# Patient Record
Sex: MALE | Race: WHITE
[De-identification: names, ages, dates, MRNs, and addresses within clinical notes are randomized per-mention and may not be internally consistent; named-entity substitution may affect disease eponyms.]

---

## 2017-10-16 ENCOUNTER — HOSPITAL (OUTPATIENT)
Dept: OTHER | Age: 67
End: 2017-10-16
Attending: ORTHOPAEDIC SURGERY

## 2018-10-02 ENCOUNTER — HOSPITAL (OUTPATIENT)
Dept: OTHER | Age: 68
End: 2018-10-02
Attending: ORTHOPAEDIC SURGERY

## 2018-10-02 LAB
GLUCOSE BLDC GLUCOMTR-MCNC: 103 MG/DL (ref 65–99)
GLUCOSE BLDC GLUCOMTR-MCNC: 124 MG/DL (ref 65–99)

## 2018-12-04 ENCOUNTER — HOSPITAL (OUTPATIENT)
Dept: OTHER | Age: 68
End: 2018-12-04
Attending: ORTHOPAEDIC SURGERY

## 2018-12-18 LAB
GLUCOSE BLDC GLUCOMTR-MCNC: 178 MG/DL (ref 65–99)
GLUCOSE BLDC GLUCOMTR-MCNC: 178 MG/DL (ref 65–99)
GLUCOSE BLDC GLUCOMTR-MCNC: 188 MG/DL (ref 65–99)
GLUCOSE BLDC GLUCOMTR-MCNC: 188 MG/DL (ref 65–99)
GLUCOSE BLDC GLUCOMTR-MCNC: 213 MG/DL (ref 65–99)
GLUCOSE BLDC GLUCOMTR-MCNC: 213 MG/DL (ref 65–99)
GLUCOSE BLDC GLUCOMTR-MCNC: 234 MG/DL (ref 65–99)
GLUCOSE BLDC GLUCOMTR-MCNC: 234 MG/DL (ref 65–99)
GLUCOSE BLDC GLUCOMTR-MCNC: 237 MG/DL (ref 65–99)
GLUCOSE BLDC GLUCOMTR-MCNC: 244 MG/DL (ref 65–99)
GLUCOSE BLDC GLUCOMTR-MCNC: 290 MG/DL (ref 65–99)
GLUCOSE BLDC GLUCOMTR-MCNC: 298 MG/DL (ref 65–99)

## 2018-12-19 ENCOUNTER — DIAGNOSTIC TRANS (OUTPATIENT)
Dept: OTHER | Age: 68
End: 2018-12-19

## 2018-12-19 ENCOUNTER — HOSPITAL (OUTPATIENT)
Dept: OTHER | Age: 68
End: 2018-12-19
Attending: ORTHOPAEDIC SURGERY

## 2018-12-19 LAB
ANALYZER ANC (IANC): ABNORMAL
ANION GAP SERPL CALC-SCNC: 18 MMOL/L (ref 10–20)
BUN SERPL-MCNC: 33 MG/DL (ref 6–20)
BUN/CREAT SERPL: 20 (ref 7–25)
CALCIUM SERPL-MCNC: 8.5 MG/DL (ref 8.4–10.2)
CHLORIDE: 103 MMOL/L (ref 98–107)
CO2 SERPL-SCNC: 18 MMOL/L (ref 21–32)
CREAT SERPL-MCNC: 1.66 MG/DL (ref 0.67–1.17)
ERYTHROCYTE [DISTWIDTH] IN BLOOD: 15.4 % (ref 11–15)
GLUCOSE BLDC GLUCOMTR-MCNC: 142 MG/DL (ref 65–99)
GLUCOSE BLDC GLUCOMTR-MCNC: 174 MG/DL (ref 65–99)
GLUCOSE BLDC GLUCOMTR-MCNC: 203 MG/DL (ref 65–99)
GLUCOSE BLDC GLUCOMTR-MCNC: 221 MG/DL (ref 65–99)
GLUCOSE BLDC GLUCOMTR-MCNC: 226 MG/DL (ref 65–99)
GLUCOSE BLDC GLUCOMTR-MCNC: 259 MG/DL (ref 65–99)
GLUCOSE BLDC GLUCOMTR-MCNC: 95 MG/DL (ref 65–99)
GLUCOSE SERPL-MCNC: 245 MG/DL (ref 65–99)
HEMATOCRIT: 43 % (ref 39–51)
HGB BLD-MCNC: 13.7 GM/DL (ref 13–17)
MAGNESIUM SERPL-MCNC: 2 MG/DL (ref 1.7–2.4)
MCH RBC QN AUTO: 26.9 PG (ref 26–34)
MCHC RBC AUTO-ENTMCNC: 31.9 GM/DL (ref 32–36.5)
MCV RBC AUTO: 84.3 FL (ref 78–100)
NRBC (NRBCRE): 0 /100 WBC
PHOSPHATE SERPL-MCNC: 4.6 MG/DL (ref 2.4–4.7)
PLATELET # BLD: 280 THOUSAND/MCL (ref 140–450)
POTASSIUM SERPL-SCNC: 4.1 MMOL/L (ref 3.4–5.1)
RBC # BLD: 5.1 MILLION/MCL (ref 4.5–5.9)
SODIUM SERPL-SCNC: 135 MMOL/L (ref 135–145)
TROPONIN I SERPL HS-MCNC: <0.02 NG/ML
WBC # BLD: 11 THOUSAND/MCL (ref 4.2–11)

## 2018-12-20 LAB
ANALYZER ANC (IANC): ABNORMAL
ANION GAP SERPL CALC-SCNC: 13 MMOL/L (ref 10–20)
BUN SERPL-MCNC: 38 MG/DL (ref 6–20)
BUN/CREAT SERPL: 22 (ref 7–25)
CALCIUM SERPL-MCNC: 8.2 MG/DL (ref 8.4–10.2)
CHLORIDE: 106 MMOL/L (ref 98–107)
CO2 SERPL-SCNC: 22 MMOL/L (ref 21–32)
CREAT SERPL-MCNC: 1.73 MG/DL (ref 0.67–1.17)
ERYTHROCYTE [DISTWIDTH] IN BLOOD: 15.5 % (ref 11–15)
GLUCOSE BLDC GLUCOMTR-MCNC: 104 MG/DL (ref 65–99)
GLUCOSE BLDC GLUCOMTR-MCNC: 125 MG/DL (ref 65–99)
GLUCOSE BLDC GLUCOMTR-MCNC: 142 MG/DL (ref 65–99)
GLUCOSE BLDC GLUCOMTR-MCNC: 144 MG/DL (ref 65–99)
GLUCOSE BLDC GLUCOMTR-MCNC: 169 MG/DL (ref 65–99)
GLUCOSE BLDC GLUCOMTR-MCNC: 57 MG/DL (ref 65–99)
GLUCOSE SERPL-MCNC: 102 MG/DL (ref 65–99)
HEMATOCRIT: 34.8 % (ref 39–51)
HGB BLD-MCNC: 11.6 GM/DL (ref 13–17)
MAGNESIUM SERPL-MCNC: 2.2 MG/DL (ref 1.7–2.4)
MCH RBC QN AUTO: 28.2 PG (ref 26–34)
MCHC RBC AUTO-ENTMCNC: 33.3 GM/DL (ref 32–36.5)
MCV RBC AUTO: 84.7 FL (ref 78–100)
NRBC (NRBCRE): 0 /100 WBC
PHOSPHATE SERPL-MCNC: 4.2 MG/DL (ref 2.4–4.7)
PLATELET # BLD: 182 THOUSAND/MCL (ref 140–450)
POTASSIUM SERPL-SCNC: 3.8 MMOL/L (ref 3.4–5.1)
RBC # BLD: 4.11 MILLION/MCL (ref 4.5–5.9)
SODIUM SERPL-SCNC: 137 MMOL/L (ref 135–145)
WBC # BLD: 6.5 THOUSAND/MCL (ref 4.2–11)

## 2018-12-21 LAB
ANION GAP SERPL CALC-SCNC: 9 MMOL/L (ref 10–20)
BUN SERPL-MCNC: 26 MG/DL (ref 6–20)
BUN/CREAT SERPL: 21 (ref 7–25)
CALCIUM SERPL-MCNC: 8.4 MG/DL (ref 8.4–10.2)
CHLORIDE: 109 MMOL/L (ref 98–107)
CO2 SERPL-SCNC: 24 MMOL/L (ref 21–32)
CREAT SERPL-MCNC: 1.22 MG/DL (ref 0.67–1.17)
GLUCOSE BLDC GLUCOMTR-MCNC: 113 MG/DL (ref 65–99)
GLUCOSE BLDC GLUCOMTR-MCNC: 120 MG/DL (ref 65–99)
GLUCOSE BLDC GLUCOMTR-MCNC: 122 MG/DL (ref 65–99)
GLUCOSE BLDC GLUCOMTR-MCNC: 122 MG/DL (ref 65–99)
GLUCOSE BLDC GLUCOMTR-MCNC: 132 MG/DL (ref 65–99)
GLUCOSE BLDC GLUCOMTR-MCNC: 140 MG/DL (ref 65–99)
GLUCOSE BLDC GLUCOMTR-MCNC: 64 MG/DL (ref 65–99)
GLUCOSE BLDC GLUCOMTR-MCNC: 67 MG/DL (ref 65–99)
GLUCOSE BLDC GLUCOMTR-MCNC: 77 MG/DL (ref 65–99)
GLUCOSE BLDC GLUCOMTR-MCNC: 91 MG/DL (ref 65–99)
GLUCOSE SERPL-MCNC: 122 MG/DL (ref 65–99)
PATHOLOGIST NAME: NORMAL
POTASSIUM SERPL-SCNC: 4.3 MMOL/L (ref 3.4–5.1)
SODIUM SERPL-SCNC: 138 MMOL/L (ref 135–145)

## 2018-12-22 LAB
GLUCOSE BLDC GLUCOMTR-MCNC: 110 MG/DL (ref 65–99)
GLUCOSE BLDC GLUCOMTR-MCNC: 149 MG/DL (ref 65–99)
GLUCOSE BLDC GLUCOMTR-MCNC: 181 MG/DL (ref 65–99)

## 2019-04-02 ENCOUNTER — HOSPITAL (OUTPATIENT)
Dept: OTHER | Age: 69
End: 2019-04-02
Attending: ORTHOPAEDIC SURGERY

## 2019-04-02 ENCOUNTER — HOSPITAL (OUTPATIENT)
Dept: OTHER | Age: 69
End: 2019-04-02

## 2019-04-02 LAB
GLUCOSE BLDC GLUCOMTR-MCNC: 132 MG/DL (ref 65–99)
GLUCOSE BLDC GLUCOMTR-MCNC: 132 MG/DL (ref 65–99)
GLUCOSE BLDC GLUCOMTR-MCNC: 150 MG/DL (ref 65–99)
GLUCOSE BLDC GLUCOMTR-MCNC: 150 MG/DL (ref 65–99)
GLUCOSE BLDC GLUCOMTR-MCNC: 152 MG/DL (ref 65–99)
GLUCOSE BLDC GLUCOMTR-MCNC: 152 MG/DL (ref 65–99)
GLUCOSE BLDC GLUCOMTR-MCNC: 162 MG/DL (ref 65–99)
GLUCOSE BLDC GLUCOMTR-MCNC: 162 MG/DL (ref 65–99)

## 2019-04-03 ENCOUNTER — HOSPITAL (OUTPATIENT)
Dept: OTHER | Age: 69
End: 2019-04-03

## 2019-04-03 LAB
ANALYZER ANC (IANC): ABNORMAL
ERYTHROCYTE [DISTWIDTH] IN BLOOD: 15.3 % (ref 11–15)
GLUCOSE BLDC GLUCOMTR-MCNC: 151 MG/DL (ref 65–99)
GLUCOSE BLDC GLUCOMTR-MCNC: 166 MG/DL (ref 65–99)
HEMATOCRIT: 39.2 % (ref 39–51)
HGB BLD-MCNC: 12.5 GM/DL (ref 13–17)
MCH RBC QN AUTO: 27.2 PG (ref 26–34)
MCHC RBC AUTO-ENTMCNC: 31.9 GM/DL (ref 32–36.5)
MCV RBC AUTO: 85.2 FL (ref 78–100)
NRBC (NRBCRE): 0 /100 WBC
PLATELET # BLD: 216 THOUSAND/MCL (ref 140–450)
RBC # BLD: 4.6 MILLION/MCL (ref 4.5–5.9)
WBC # BLD: 5.2 THOUSAND/MCL (ref 4.2–11)

## 2019-04-18 ENCOUNTER — HOSPITAL (OUTPATIENT)
Dept: OTHER | Age: 69
End: 2019-04-18
Attending: ORTHOPAEDIC SURGERY

## 2019-04-18 LAB
APPEARANCE FLD: ABNORMAL
APPEARANCE FLD: ABNORMAL
BASOPHILS NFR SNV: ABNORMAL %
BASOPHILS NFR SNV: ABNORMAL %
CRYSTALS FLD MICRO: NORMAL
EOSINOPHIL NFR SNV: ABNORMAL %
EOSINOPHIL NFR SNV: ABNORMAL %
LYMPHOCYTES NFR SNV: ABNORMAL %
LYMPHOCYTES NFR SNV: ABNORMAL %
MONOS+MACROS NFR SNV: 4 % (ref 0–71)
MONOS+MACROS NFR SNV: 4 % (ref 0–71)
NEUTS SEG NFR SNV: 96 % (ref 0–25)
NEUTS SEG NFR SNV: 96 % (ref 0–25)
NUC CELL # SNV MANUAL: ABNORMAL /MCL (ref 0–200)
NUC CELL # SNV MANUAL: ABNORMAL /MCL (ref 0–200)
SERVICE CMNT-IMP: ABNORMAL
SERVICE CMNT-IMP: ABNORMAL
SPECIMEN SOURCE: ABNORMAL
SPECIMEN SOURCE: ABNORMAL
SPECIMEN SOURCE: NORMAL
SPECIMEN VOL FLD: 22 ML
SPECIMEN VOL FLD: 22 ML

## 2019-04-19 LAB
CRYSTALS FLD MICRO: NORMAL
SPECIMEN SOURCE: NORMAL

## 2019-04-23 ENCOUNTER — HOSPITAL (OUTPATIENT)
Dept: OTHER | Age: 69
End: 2019-04-23

## 2019-04-23 ENCOUNTER — HOSPITAL (OUTPATIENT)
Dept: OTHER | Age: 69
End: 2019-04-23
Attending: ORTHOPAEDIC SURGERY

## 2019-04-23 LAB
ANER/AEROBE CUL/SMR (AANC) HL: NORMAL
CLINDAMYCIN SUSC ISLT: ABNORMAL
DAPTOMYCIN SUSC ISLT: ABNORMAL
ERYTHROMYCIN SUSC ISLT: ABNORMAL
GENTAMICIN SUSC ISLT: ABNORMAL
GLUCOSE BLDC GLUCOMTR-MCNC: 255 MG/DL (ref 65–99)
GLUCOSE BLDC GLUCOMTR-MCNC: 255 MG/DL (ref 65–99)
GLUCOSE BLDC GLUCOMTR-MCNC: 256 MG/DL (ref 65–99)
GLUCOSE BLDC GLUCOMTR-MCNC: 256 MG/DL (ref 65–99)
GLUCOSE BLDC GLUCOMTR-MCNC: 300 MG/DL (ref 65–99)
GLUCOSE BLDC GLUCOMTR-MCNC: 300 MG/DL (ref 65–99)
LEVOFLOXACIN SUSC ISLT: ABNORMAL
LINEZOLID SUSC ISLT: ABNORMAL
MINOCYCLINE SUSC ISLT: ABNORMAL
OXACILLIN SUSC ISLT: ABNORMAL
TETRACYCLINE SUSC ISLT: ABNORMAL
TMP SMX SUSC ISLT: ABNORMAL
VANCOMYCIN SUSC ISLT: ABNORMAL

## 2019-04-24 ENCOUNTER — HOSPITAL (OUTPATIENT)
Dept: OTHER | Age: 69
End: 2019-04-24

## 2019-04-24 LAB
ANALYZER ANC (IANC): ABNORMAL
ANION GAP SERPL CALC-SCNC: 11 MMOL/L (ref 10–20)
BUN SERPL-MCNC: 18 MG/DL (ref 6–20)
BUN/CREAT SERPL: 16 (ref 7–25)
CALCIUM SERPL-MCNC: 9 MG/DL (ref 8.4–10.2)
CHLORIDE: 99 MMOL/L (ref 98–107)
CO2 SERPL-SCNC: 28 MMOL/L (ref 21–32)
CREAT SERPL-MCNC: 1.11 MG/DL (ref 0.67–1.17)
ERYTHROCYTE [DISTWIDTH] IN BLOOD: 16.3 % (ref 11–15)
GLUCOSE BLDC GLUCOMTR-MCNC: 237 MG/DL (ref 65–99)
GLUCOSE BLDC GLUCOMTR-MCNC: 250 MG/DL (ref 65–99)
GLUCOSE BLDC GLUCOMTR-MCNC: 259 MG/DL (ref 65–99)
GLUCOSE BLDC GLUCOMTR-MCNC: 288 MG/DL (ref 65–99)
GLUCOSE BLDC GLUCOMTR-MCNC: 335 MG/DL (ref 65–99)
GLUCOSE SERPL-MCNC: 232 MG/DL (ref 65–99)
HEMATOCRIT: 37.4 % (ref 39–51)
HGB BLD-MCNC: 11.8 GM/DL (ref 13–17)
MCH RBC QN AUTO: 27.3 PG (ref 26–34)
MCHC RBC AUTO-ENTMCNC: 31.6 GM/DL (ref 32–36.5)
MCV RBC AUTO: 86.6 FL (ref 78–100)
NRBC (NRBCRE): 0 /100 WBC
PLATELET # BLD: 361 THOUSAND/MCL (ref 140–450)
POTASSIUM SERPL-SCNC: 4.8 MMOL/L (ref 3.4–5.1)
RBC # BLD: 4.32 MILLION/MCL (ref 4.5–5.9)
SODIUM SERPL-SCNC: 133 MMOL/L (ref 135–145)
WBC # BLD: 7 THOUSAND/MCL (ref 4.2–11)

## 2019-04-25 LAB
ANALYZER ANC (IANC): ABNORMAL
ANION GAP SERPL CALC-SCNC: 14 MMOL/L (ref 10–20)
BASOPHILS # BLD: 0 THOUSAND/MCL (ref 0–0.3)
BASOPHILS NFR BLD: 0 %
BUN SERPL-MCNC: 16 MG/DL (ref 6–20)
BUN/CREAT SERPL: 13 (ref 7–25)
CALCIUM SERPL-MCNC: 8.5 MG/DL (ref 8.4–10.2)
CHLORIDE: 99 MMOL/L (ref 98–107)
CK SERPL-CCNC: 124 UNIT/L (ref 39–308)
CO2 SERPL-SCNC: 26 MMOL/L (ref 21–32)
CREAT SERPL-MCNC: 1.21 MG/DL (ref 0.67–1.17)
CRP SERPL-MCNC: 14 MG/DL
DIFFERENTIAL METHOD BLD: ABNORMAL
EOSINOPHIL # BLD: 0.3 THOUSAND/MCL (ref 0.1–0.5)
EOSINOPHIL NFR BLD: 4 %
ERYTHROCYTE [DISTWIDTH] IN BLOOD: 16.1 % (ref 11–15)
ERYTHROCYTE [SEDIMENTATION RATE] IN BLOOD BY WESTERGREN METHOD: 119 MM/HR (ref 0–20)
GLUCOSE BLDC GLUCOMTR-MCNC: 263 MG/DL (ref 65–99)
GLUCOSE BLDC GLUCOMTR-MCNC: 286 MG/DL (ref 65–99)
GLUCOSE BLDC GLUCOMTR-MCNC: 290 MG/DL (ref 65–99)
GLUCOSE BLDC GLUCOMTR-MCNC: 316 MG/DL (ref 65–99)
GLUCOSE BLDC GLUCOMTR-MCNC: 345 MG/DL (ref 65–99)
GLUCOSE SERPL-MCNC: 263 MG/DL (ref 65–99)
HEMATOCRIT: 34.1 % (ref 39–51)
HGB BLD-MCNC: 10.9 GM/DL (ref 13–17)
IMM GRANULOCYTES # BLD AUTO: 0.1 THOUSAND/MCL (ref 0–0.2)
IMM GRANULOCYTES NFR BLD: 1 %
LYMPHOCYTES # BLD: 1.2 THOUSAND/MCL (ref 1–4)
LYMPHOCYTES NFR BLD: 15 %
MCH RBC QN AUTO: 27.5 PG (ref 26–34)
MCHC RBC AUTO-ENTMCNC: 32 GM/DL (ref 32–36.5)
MCV RBC AUTO: 85.9 FL (ref 78–100)
MONOCYTES # BLD: 0.5 THOUSAND/MCL (ref 0.3–0.9)
MONOCYTES NFR BLD: 6 %
NEUTROPHILS # BLD: 5.8 THOUSAND/MCL (ref 1.8–7.7)
NEUTROPHILS NFR BLD: 74 %
NEUTS SEG NFR BLD: ABNORMAL %
NRBC (NRBCRE): 0 /100 WBC
PLATELET # BLD: 361 THOUSAND/MCL (ref 140–450)
POTASSIUM SERPL-SCNC: 5.5 MMOL/L (ref 3.4–5.1)
RBC # BLD: 3.97 MILLION/MCL (ref 4.5–5.9)
SODIUM SERPL-SCNC: 134 MMOL/L (ref 135–145)
WBC # BLD: 7.9 THOUSAND/MCL (ref 4.2–11)

## 2019-04-26 LAB
ANION GAP SERPL CALC-SCNC: 10 MMOL/L (ref 10–20)
BUN SERPL-MCNC: 15 MG/DL (ref 6–20)
BUN/CREAT SERPL: 14 (ref 7–25)
CALCIUM SERPL-MCNC: 8.7 MG/DL (ref 8.4–10.2)
CHLORIDE: 98 MMOL/L (ref 98–107)
CO2 SERPL-SCNC: 30 MMOL/L (ref 21–32)
CREAT SERPL-MCNC: 1.11 MG/DL (ref 0.67–1.17)
GLUCOSE BLDC GLUCOMTR-MCNC: 249 MG/DL (ref 65–99)
GLUCOSE BLDC GLUCOMTR-MCNC: 252 MG/DL (ref 65–99)
GLUCOSE BLDC GLUCOMTR-MCNC: 277 MG/DL (ref 65–99)
GLUCOSE BLDC GLUCOMTR-MCNC: 293 MG/DL (ref 65–99)
GLUCOSE BLDC GLUCOMTR-MCNC: 299 MG/DL (ref 65–99)
GLUCOSE SERPL-MCNC: 279 MG/DL (ref 65–99)
POTASSIUM SERPL-SCNC: 4.9 MMOL/L (ref 3.4–5.1)
SODIUM SERPL-SCNC: 133 MMOL/L (ref 135–145)

## 2019-04-27 LAB
ANION GAP SERPL CALC-SCNC: 10 MMOL/L (ref 10–20)
BUN SERPL-MCNC: 15 MG/DL (ref 6–20)
BUN/CREAT SERPL: 14 (ref 7–25)
CALCIUM SERPL-MCNC: 9.1 MG/DL (ref 8.4–10.2)
CHLORIDE: 100 MMOL/L (ref 98–107)
CO2 SERPL-SCNC: 28 MMOL/L (ref 21–32)
CREAT SERPL-MCNC: 1.05 MG/DL (ref 0.67–1.17)
GLUCOSE BLDC GLUCOMTR-MCNC: 192 MG/DL (ref 65–99)
GLUCOSE BLDC GLUCOMTR-MCNC: 198 MG/DL (ref 65–99)
GLUCOSE BLDC GLUCOMTR-MCNC: 212 MG/DL (ref 65–99)
GLUCOSE BLDC GLUCOMTR-MCNC: 255 MG/DL (ref 65–99)
GLUCOSE BLDC GLUCOMTR-MCNC: 300 MG/DL (ref 65–99)
GLUCOSE SERPL-MCNC: 235 MG/DL (ref 65–99)
POTASSIUM SERPL-SCNC: 5.4 MMOL/L (ref 3.4–5.1)
SODIUM SERPL-SCNC: 133 MMOL/L (ref 135–145)

## 2019-04-28 LAB
ANION GAP SERPL CALC-SCNC: 11 MMOL/L (ref 10–20)
BUN SERPL-MCNC: 17 MG/DL (ref 6–20)
BUN/CREAT SERPL: 17 (ref 7–25)
CALCIUM SERPL-MCNC: 9 MG/DL (ref 8.4–10.2)
CHLORIDE: 101 MMOL/L (ref 98–107)
CO2 SERPL-SCNC: 24 MMOL/L (ref 21–32)
CREAT SERPL-MCNC: 1 MG/DL (ref 0.67–1.17)
GLUCOSE BLDC GLUCOMTR-MCNC: 188 MG/DL (ref 65–99)
GLUCOSE BLDC GLUCOMTR-MCNC: 230 MG/DL (ref 65–99)
GLUCOSE BLDC GLUCOMTR-MCNC: 251 MG/DL (ref 65–99)
GLUCOSE BLDC GLUCOMTR-MCNC: 277 MG/DL (ref 65–99)
GLUCOSE SERPL-MCNC: 255 MG/DL (ref 65–99)
POTASSIUM SERPL-SCNC: 4.3 MMOL/L (ref 3.4–5.1)
SODIUM SERPL-SCNC: 132 MMOL/L (ref 135–145)

## 2019-05-21 LAB
FUNGAL CULTURE/SMEAR (FNCS) HL: NORMAL

## 2019-07-23 ENCOUNTER — DIAGNOSTIC TRANS (OUTPATIENT)
Dept: OTHER | Age: 69
End: 2019-07-23

## 2019-07-23 ENCOUNTER — HOSPITAL (OUTPATIENT)
Dept: OTHER | Age: 69
End: 2019-07-23
Attending: ORTHOPAEDIC SURGERY

## 2019-07-23 ENCOUNTER — HOSPITAL (OUTPATIENT)
Dept: OTHER | Age: 69
End: 2019-07-23

## 2019-07-23 LAB
GLUCOSE BLDC GLUCOMTR-MCNC: 177 MG/DL (ref 65–99)
GLUCOSE BLDC GLUCOMTR-MCNC: 189 MG/DL (ref 65–99)

## 2019-08-02 ENCOUNTER — HOSPITAL (OUTPATIENT)
Dept: OTHER | Age: 69
End: 2019-08-02
Attending: ORTHOPAEDIC SURGERY

## 2019-09-01 ENCOUNTER — HOSPITAL (OUTPATIENT)
Dept: OTHER | Age: 69
End: 2019-09-01
Attending: ORTHOPAEDIC SURGERY

## 2019-09-05 ENCOUNTER — HOSPITAL (OUTPATIENT)
Dept: OTHER | Age: 69
End: 2019-09-05
Attending: ORTHOPAEDIC SURGERY

## 2019-09-05 ENCOUNTER — HOSPITAL (OUTPATIENT)
Dept: OTHER | Age: 69
End: 2019-09-05

## 2019-09-05 LAB
ANION GAP SERPL CALC-SCNC: 15 MMOL/L (ref 10–20)
BUN SERPL-MCNC: 19 MG/DL (ref 6–20)
BUN/CREAT SERPL: 20 (ref 7–25)
CALCIUM SERPL-MCNC: 8.8 MG/DL (ref 8.4–10.2)
CHLORIDE SERPL-SCNC: 105 MMOL/L (ref 98–107)
CO2 SERPL-SCNC: 23 MMOL/L (ref 21–32)
CREAT SERPL-MCNC: 0.94 MG/DL (ref 0.67–1.17)
GLUCOSE SERPL-MCNC: 119 MG/DL (ref 65–99)
POTASSIUM SERPL-SCNC: 4.7 MMOL/L (ref 3.4–5.1)
SODIUM SERPL-SCNC: 138 MMOL/L (ref 135–145)

## 2019-09-06 LAB
GLUCOSE BLDC GLUCOMTR-MCNC: 125 MG/DL (ref 65–99)
GLUCOSE BLDC GLUCOMTR-MCNC: 147 MG/DL (ref 65–99)

## 2019-09-11 ENCOUNTER — HOSPITAL (OUTPATIENT)
Dept: OTHER | Age: 69
End: 2019-09-11
Attending: ORTHOPAEDIC SURGERY

## 2019-09-16 ENCOUNTER — HOSPITAL (OUTPATIENT)
Dept: OTHER | Age: 69
End: 2019-09-16
Attending: ORTHOPAEDIC SURGERY

## 2019-10-01 ENCOUNTER — HOSPITAL (OUTPATIENT)
Dept: OTHER | Age: 69
End: 2019-10-01
Attending: ORTHOPAEDIC SURGERY

## 2020-11-25 ENCOUNTER — HOSPITAL ENCOUNTER (OUTPATIENT)
Facility: HOSPITAL | Age: 70
End: 2020-11-25
Attending: ORTHOPAEDIC SURGERY | Admitting: ORTHOPAEDIC SURGERY
Payer: MEDICARE

## 2020-11-25 RX ORDER — VANCOMYCIN HYDROCHLORIDE
1500 ONCE
Status: CANCELLED | OUTPATIENT
Start: 2020-12-02 | End: 2020-11-25

## 2020-11-25 RX ORDER — CEFAZOLIN SODIUM/WATER 2 G/20 ML
2 SYRINGE (ML) INTRAVENOUS ONCE
Status: CANCELLED | OUTPATIENT
Start: 2020-12-02 | End: 2020-11-25

## 2020-11-30 ENCOUNTER — HOSPITAL ENCOUNTER (INPATIENT)
Facility: HOSPITAL | Age: 70
LOS: 7 days | Discharge: HOME HEALTH | DRG: 464 | End: 2020-12-07
Attending: ORTHOPAEDIC SURGERY | Admitting: ORTHOPAEDIC SURGERY
Payer: MEDICARE

## 2020-11-30 ENCOUNTER — APPOINTMENT (OUTPATIENT)
Dept: GENERAL RADIOLOGY | Facility: HOSPITAL | Age: 70
DRG: 464 | End: 2020-11-30
Attending: INTERNAL MEDICINE
Payer: MEDICARE

## 2020-11-30 PROBLEM — Z96.651 HISTORY OF REVISION OF TOTAL REPLACEMENT OF RIGHT KNEE JOINT: Status: ACTIVE | Noted: 2020-11-30

## 2020-11-30 PROCEDURE — 99223 1ST HOSP IP/OBS HIGH 75: CPT | Performed by: HOSPITALIST

## 2020-11-30 PROCEDURE — 73630 X-RAY EXAM OF FOOT: CPT | Performed by: INTERNAL MEDICINE

## 2020-11-30 RX ORDER — OXYCODONE HYDROCHLORIDE 5 MG/1
15 TABLET ORAL EVERY 4 HOURS PRN
Status: DISCONTINUED | OUTPATIENT
Start: 2020-11-30 | End: 2020-12-07

## 2020-11-30 RX ORDER — ACETAMINOPHEN 325 MG/1
650 TABLET ORAL EVERY 6 HOURS PRN
Status: DISCONTINUED | OUTPATIENT
Start: 2020-11-30 | End: 2020-12-03

## 2020-11-30 RX ORDER — HYDROCODONE BITARTRATE AND ACETAMINOPHEN 10; 325 MG/1; MG/1
1 TABLET ORAL EVERY 6 HOURS PRN
COMMUNITY

## 2020-11-30 RX ORDER — LISINOPRIL 10 MG/1
10 TABLET ORAL DAILY
Status: DISCONTINUED | OUTPATIENT
Start: 2020-11-30 | End: 2020-12-03

## 2020-11-30 RX ORDER — BISACODYL 10 MG
10 SUPPOSITORY, RECTAL RECTAL
Status: DISCONTINUED | OUTPATIENT
Start: 2020-11-30 | End: 2020-12-02

## 2020-11-30 RX ORDER — METOCLOPRAMIDE HYDROCHLORIDE 5 MG/ML
5 INJECTION INTRAMUSCULAR; INTRAVENOUS EVERY 8 HOURS PRN
Status: DISCONTINUED | OUTPATIENT
Start: 2020-11-30 | End: 2020-12-03

## 2020-11-30 RX ORDER — AMLODIPINE BESYLATE 5 MG/1
5 TABLET ORAL DAILY
COMMUNITY

## 2020-11-30 RX ORDER — TORSEMIDE 10 MG/1
20 TABLET ORAL DAILY
COMMUNITY

## 2020-11-30 RX ORDER — DEXTROSE MONOHYDRATE 25 G/50ML
50 INJECTION, SOLUTION INTRAVENOUS
Status: DISCONTINUED | OUTPATIENT
Start: 2020-11-30 | End: 2020-12-07

## 2020-11-30 RX ORDER — POLYETHYLENE GLYCOL 3350 17 G/17G
17 POWDER, FOR SOLUTION ORAL DAILY PRN
Status: DISCONTINUED | OUTPATIENT
Start: 2020-11-30 | End: 2020-12-02

## 2020-11-30 RX ORDER — OXYCODONE HYDROCHLORIDE 15 MG/1
15 TABLET ORAL EVERY 4 HOURS PRN
COMMUNITY

## 2020-11-30 RX ORDER — OMEPRAZOLE 20 MG/1
20 CAPSULE, DELAYED RELEASE ORAL
COMMUNITY

## 2020-11-30 RX ORDER — SODIUM CHLORIDE 0.9 % (FLUSH) 0.9 %
3 SYRINGE (ML) INJECTION AS NEEDED
Status: DISCONTINUED | OUTPATIENT
Start: 2020-11-30 | End: 2020-12-07

## 2020-11-30 RX ORDER — MORPHINE SULFATE 30 MG/1
60 TABLET, FILM COATED, EXTENDED RELEASE ORAL EVERY 12 HOURS SCHEDULED
Status: DISCONTINUED | OUTPATIENT
Start: 2020-11-30 | End: 2020-12-07

## 2020-11-30 RX ORDER — ENOXAPARIN SODIUM 100 MG/ML
40 INJECTION SUBCUTANEOUS DAILY
Status: DISCONTINUED | OUTPATIENT
Start: 2020-11-30 | End: 2020-12-02 | Stop reason: ALTCHOICE

## 2020-11-30 RX ORDER — ATORVASTATIN CALCIUM 10 MG/1
10 TABLET, FILM COATED ORAL NIGHTLY
Status: DISCONTINUED | OUTPATIENT
Start: 2020-11-30 | End: 2020-12-03

## 2020-11-30 RX ORDER — TORSEMIDE 5 MG/1
20 TABLET ORAL DAILY
Status: DISCONTINUED | OUTPATIENT
Start: 2020-11-30 | End: 2020-12-03

## 2020-11-30 RX ORDER — ONDANSETRON 2 MG/ML
4 INJECTION INTRAMUSCULAR; INTRAVENOUS EVERY 6 HOURS PRN
Status: DISCONTINUED | OUTPATIENT
Start: 2020-11-30 | End: 2020-12-02

## 2020-11-30 RX ORDER — PANTOPRAZOLE SODIUM 20 MG/1
20 TABLET, DELAYED RELEASE ORAL
Status: DISCONTINUED | OUTPATIENT
Start: 2020-11-30 | End: 2020-12-07

## 2020-11-30 RX ORDER — POTASSIUM CHLORIDE 20 MEQ/1
20 TABLET, EXTENDED RELEASE ORAL DAILY
COMMUNITY

## 2020-11-30 RX ORDER — ATORVASTATIN CALCIUM 10 MG/1
10 TABLET, FILM COATED ORAL NIGHTLY
Status: ON HOLD | COMMUNITY
End: 2020-12-07

## 2020-11-30 RX ORDER — SODIUM PHOSPHATE, DIBASIC AND SODIUM PHOSPHATE, MONOBASIC 7; 19 G/133ML; G/133ML
1 ENEMA RECTAL ONCE AS NEEDED
Status: DISCONTINUED | OUTPATIENT
Start: 2020-11-30 | End: 2020-12-02

## 2020-11-30 RX ORDER — LISINOPRIL 10 MG/1
10 TABLET ORAL DAILY
COMMUNITY

## 2020-11-30 RX ORDER — MORPHINE SULFATE 60 MG/1
60 TABLET, FILM COATED, EXTENDED RELEASE ORAL EVERY 12 HOURS SCHEDULED
COMMUNITY

## 2020-11-30 RX ORDER — DOCUSATE SODIUM 100 MG/1
100 CAPSULE, LIQUID FILLED ORAL 2 TIMES DAILY
Status: DISCONTINUED | OUTPATIENT
Start: 2020-11-30 | End: 2020-12-02 | Stop reason: ALTCHOICE

## 2020-11-30 RX ORDER — AMLODIPINE BESYLATE 5 MG/1
5 TABLET ORAL DAILY
Status: DISCONTINUED | OUTPATIENT
Start: 2020-11-30 | End: 2020-12-03

## 2020-11-30 RX ORDER — HYDROCODONE BITARTRATE AND ACETAMINOPHEN 10; 325 MG/1; MG/1
1 TABLET ORAL EVERY 6 HOURS PRN
Status: DISCONTINUED | OUTPATIENT
Start: 2020-11-30 | End: 2020-12-03

## 2020-11-30 NOTE — PLAN OF CARE
Pt received as a direct admit. A+Ox4, VSS, saturating well on room air. Pain controlled with scheduled morphine ER and PRN oxy IR. Up with 1 assist and walker. Pt has multiple wounds, wound nurse on consult. Pt is diabetic, blood sugar controlled.  Plan is pre-medicate as appropriate  Outcome: Progressing     Problem: RISK FOR INFECTION - ADULT  Goal: Absence of fever/infection during anticipated neutropenic period  Description: INTERVENTIONS  - Monitor WBC  - Administer growth factors as ordered  - Mingo Progressing  Goal: Maintain proper alignment of affected body part  Description: INTERVENTIONS:  - Support and protect limb and body alignment per provider's orders  - Instruct and reinforce with patient and family use of appropriate assistive device and p

## 2020-11-30 NOTE — CONSULTS
[facility] CONSULT [facility]    Lisbeth Nissen Case Patient Status:  Inpatient    1950 MRN [id number]   Location [facility] 4W/SW/SE Attending Sandra Goncalves MD   Hosp Day # 0 PCP No primary care provider on file. Allergies:    Gabapentin              HIVES  Lyrica [Pregabalin]     HIVES    Medications:    Current Facility-Administered Medications:   •  amLODIPine Besylate (NORVASC) tab 5 mg, 5 mg, Oral, Daily  •  atorvastatin (LIPITOR) tab 10 mg, 10 mg, Oral, Night •  Insulin Aspart Pen (NOVOLOG) 100 UNIT/ML flexpen 1-7 Units, 1-7 Units, Subcutaneous, TID CC    Review of Systems:  CONSTITUTIONAL:  No weight loss, weakness or fatigue. HEENT:  Eyes:  No visual loss, blurred vision, double vision or yellow sclerae.  Ear ASSESSMENT:    Antibiotics: None    77-year-old male with a history of obesity, CKD, A. fib, HTN, diabetes with bilateral TKA and previous right knee PJI who now presents to hospital for management of right knee infection implant surgery.   History of MRSA - OR 12/19/19 for R knee MARIAA and spacer with cx MRSA - (S- vanco, linezoid, clinda, dapto, rifampin; R-bactrim, tetracycline) - s/p IV vancomycin x6 weeks until mid-Jan 2020   - R knee infection with quad repair 4/23/19 - s/p minocycline suppression   - p

## 2020-11-30 NOTE — CM/SW NOTE
11/30/20 1200   CM/SW Referral Data   Referral Source Physician   Reason for Referral Discharge planning   Informant Patient   Patient Info   Patient's Mental Status Alert;Oriented   Patient's [address]   Number of Levels in Home 2   Number by Dr. Cameron Turcios on 12/2, then home with IV abx on 12/3. / to remain available for support and/or discharge planning. Esmer Levi RN, BSN  Nurse   [phone]

## 2020-11-30 NOTE — RESPIRATORY THERAPY NOTE
MIGUELITO ASSESSMENT:    Pt does have a previous diagnosis of MIGUELITO. Pt does routinely use a CPAP device at home.    Pt to be placed on CPAP: no  Pt refused: yes  Patient using CPAP machine  at home 3-5 days in a  week, pt. refused to use it in the hospital.

## 2020-11-30 NOTE — WOUND PROGRESS NOTE
WOUND CARE NOTE    History:  No past medical history on file. No past surgical history on file.    Social History    Tobacco Use      Smoking status: Not on file    Alcohol use: Not on file    Drug use: Not on file      Lower Extremity Assessment:  RLE 1 he has biopsies from his dermatologist, he has dry intact sutures and a dry scab from possibly 4-6 weeks per the pt.  I recommended he follow up for suture removal, right lateral foot with Batista Grade 2 diabetic ulcer with yellowish slough tissue and some

## 2020-11-30 NOTE — H&P
[facility]    History & Physical    [place] Case Patient Status:  Inpatient    1950 MRN [id number]   Location [facility] 4W/SW/SE Attending Bernie Roque MD   Hosp Day # 0 PCP No primary care provider on file.      Date:  MG Oral Tab, Take 1 tablet by mouth every 6 (six) hours as needed for Pain. Review of Systems:   Pertinent items are noted in HPI. 12 systems reviewed and otherwise negative.      Physical Exam:   Vital Signs:  Blood pressure 159/65, pulse 88 this admission in examining patient, obtaining history, reviewing records and d/w patient/family/consultants.

## 2020-11-30 NOTE — HOME CARE LIAISON
Received referral from Néstor DOUGLAS for [facility]. Unable to accept due to staffing. Re-referred to [address] [place] [address]. Reserved in 0126 Suzan Bhardwaj. LOGAN/Beatrice DOUGLAS aware.

## 2020-12-01 PROCEDURE — 0JBQ0ZZ EXCISION OF RIGHT FOOT SUBCUTANEOUS TISSUE AND FASCIA, OPEN APPROACH: ICD-10-PCS | Performed by: HOSPITALIST

## 2020-12-01 PROCEDURE — 99233 SBSQ HOSP IP/OBS HIGH 50: CPT | Performed by: HOSPITALIST

## 2020-12-01 RX ORDER — SODIUM CHLORIDE 9 MG/ML
INJECTION, SOLUTION INTRAVENOUS CONTINUOUS
Status: DISCONTINUED | OUTPATIENT
Start: 2020-12-02 | End: 2020-12-02 | Stop reason: ALTCHOICE

## 2020-12-01 RX ORDER — DEXTROSE MONOHYDRATE 25 G/50ML
50 INJECTION, SOLUTION INTRAVENOUS
Status: DISCONTINUED | OUTPATIENT
Start: 2020-12-01 | End: 2020-12-01

## 2020-12-01 NOTE — CONSULTS
[facility]    Report of Consultation    Lien Franco Case Patient Status:  Inpatient    1950 MRN [id number]   Location [facility] 4W/SW/SE Attending Vikas Garduno MD   [facility] Day # 1 PCP No primary care provider on file.      Hang Berger Sodium (PROTONIX) EC tab 20 mg, 20 mg, Oral, QAM AC    •  oxyCODONE HCl (OXY-IR) cap/tab 15 mg, 15 mg, Oral, Q4H PRN    •  torsemide (DEMADEX) tab 20 mg, 20 mg, Oral, Daily    •  glucose (DEX4) oral liquid 15 g, 15 g, Oral, Q15 Min PRN    Or    •  Glucose- (twelve) hours. •  OxyCODONE HCl IR 15 MG Oral Tab, Take 15 mg by mouth every 4 (four) hours as needed for Pain. •  metFORMIN HCl 850 MG Oral Tab, Take 850 mg by mouth 2 (two) times daily with meals.     •  Potassium Chloride ER 20 MEQ Oral Tab CR, Ta No POP noted. MUSCULOSKELETAL: There is not POP to the foot. There are deformities present. Contractured digits, b/l  1-5. INTEGUMENT: Skin is cool shiny and thin.    Wound right lateral aspect right foot base of 5th MT measures 2.5cm in diameter t Mellitus  Neuropathy    Recommendations:  Full exam and history performed. Bedside excisional debridement of fibrotic tissue performed. Xrays labs and history reviewed.    Discussed with pt at length that wound does not probe and xray findings are quest

## 2020-12-01 NOTE — PROGRESS NOTES
Orthopedics    Patient will be seen tomorrow morning. Plan for irrigation debridement of a postoperative infected right knee on Wednesday. He will need preoperative medical clearance for this procedure.   It will be a relatively simple procedure with no m

## 2020-12-01 NOTE — PHYSICAL THERAPY NOTE
MD order received from Dr Glenys Bertrand, chart reviewed. Patient is admitted from SNF for urgent procedure. Here for I&D tomorrow on knee revision-2 stage exchange, due to new onset drainage after revision 9/10/20. Noted poor bone stock per Dr Fiordaliza Choudhury.  He is up in

## 2020-12-01 NOTE — H&P
History & Physical Examination    Patient Name: Fidel March  MRN: [id number]  [facility]: [id number]  YOB: 1950    Diagnosis: Infected Revision R TKA    Present Illness: Fidel March is a 65year old yo male with a history of a prior 2-stage ex Tab, Take 10 mg by mouth nightly., Disp: , Rfl: , 11/29/2020 at 2100    •  torsemide 10 MG Oral Tab, Take 20 mg by mouth daily. , Disp: , Rfl: , 11/29/2020 at 900    •  lisinopril 10 MG Oral Tab, Take 10 mg by mouth daily. , Disp: , Rfl: , 11/29/2020 at 800 3350 (MIRALAX) powder packet 17 g, 17 g, Oral, Daily PRN    •  magnesium hydroxide (MILK OF MAGNESIA) 400 MG/5ML suspension 30 mL, 30 mL, Oral, Daily PRN    •  bisacodyl (DULCOLAX) rectal suppository 10 mg, 10 mg, Rectal, Daily PRN    •  Fleet Enema (FLEET

## 2020-12-01 NOTE — PROGRESS NOTES
[facility]    Cardiology Progress Note  [facility]    [facility] Case Patient Status:  Inpatient    1950 MRN [id number]   Location [facility] 4W/SW/SE Attending Gita Matthews MD   Hosp Day # 1 PCP No (L) 12/01/2020    .0 12/01/2020    CREATSERUM 0.99 12/01/2020    BUN 19 (H) 12/01/2020     12/01/2020    K 4.2 12/01/2020     12/01/2020    CO2 32.0 12/01/2020     (H) 12/01/2020    CA 8.8 12/01/2020    ALB 2.5 (L) 11/30/2020    A S3, or extra cardiac sounds. Lungs: Clear without wheezes, rales, rhonchi or dullness. Normal excursions and effort. Abdomen: Soft, non-tender. No organosplenomegally, mass or rebound, BS-present. Extremities: Without clubbing, cyanosis or edema.   Licha

## 2020-12-01 NOTE — PLAN OF CARE
Pain managed with scheduled MS contin and Oxy IR prn. Ambulating with walker and SBA, remains weak. Accu checks ACHS, receiving scheduled insulin with meals. Remote tele in place, no calls. Vitals q 4 hours complete.  Lovenox held in AM. Voiding WNL, no BM monitor pain and request assistance  - Assess pain using appropriate pain scale  - Administer analgesics based on type and severity of pain and evaluate response  - Implement non-pharmacological measures as appropriate and evaluate response  - Consider cul Problem: MUSCULOSKELETAL - ADULT  Goal: Return mobility to safest level of function  Description: INTERVENTIONS:  - Assess patient stability and activity tolerance for standing, transferring and ambulating w/ or w/o assistive devices  - Assist with trans

## 2020-12-01 NOTE — PROGRESS NOTES
Pre-op visit:  S Pt was awake and talkative, appreciative of visit. Experiencing anxiety regarding some closure to his knee issues. O Pt anticipating procedure following day for his knee.     A Prayed with pt, engaged in active listening and compassiona

## 2020-12-02 ENCOUNTER — ANESTHESIA (OUTPATIENT)
Dept: SURGERY | Facility: HOSPITAL | Age: 70
DRG: 464 | End: 2020-12-02
Payer: MEDICARE

## 2020-12-02 ENCOUNTER — APPOINTMENT (OUTPATIENT)
Dept: GENERAL RADIOLOGY | Facility: HOSPITAL | Age: 70
DRG: 464 | End: 2020-12-02
Attending: NURSE PRACTITIONER
Payer: MEDICARE

## 2020-12-02 ENCOUNTER — ANESTHESIA EVENT (OUTPATIENT)
Dept: SURGERY | Facility: HOSPITAL | Age: 70
DRG: 464 | End: 2020-12-02
Payer: MEDICARE

## 2020-12-02 PROCEDURE — 73560 X-RAY EXAM OF KNEE 1 OR 2: CPT | Performed by: NURSE PRACTITIONER

## 2020-12-02 PROCEDURE — 0SUC09Z SUPPLEMENT RIGHT KNEE JOINT WITH LINER, OPEN APPROACH: ICD-10-PCS | Performed by: ORTHOPAEDIC SURGERY

## 2020-12-02 PROCEDURE — 99232 SBSQ HOSP IP/OBS MODERATE 35: CPT | Performed by: HOSPITALIST

## 2020-12-02 PROCEDURE — 0SPC09Z REMOVAL OF LINER FROM RIGHT KNEE JOINT, OPEN APPROACH: ICD-10-PCS | Performed by: ORTHOPAEDIC SURGERY

## 2020-12-02 RX ORDER — PROCHLORPERAZINE EDISYLATE 5 MG/ML
5 INJECTION INTRAMUSCULAR; INTRAVENOUS ONCE AS NEEDED
Status: DISCONTINUED | OUTPATIENT
Start: 2020-12-02 | End: 2020-12-02 | Stop reason: HOSPADM

## 2020-12-02 RX ORDER — VANCOMYCIN HYDROCHLORIDE 1 G/20ML
INJECTION, POWDER, LYOPHILIZED, FOR SOLUTION INTRAVENOUS AS NEEDED
Status: DISCONTINUED | OUTPATIENT
Start: 2020-12-02 | End: 2020-12-02 | Stop reason: HOSPADM

## 2020-12-02 RX ORDER — SENNOSIDES 8.6 MG
17.2 TABLET ORAL NIGHTLY
Status: DISCONTINUED | OUTPATIENT
Start: 2020-12-02 | End: 2020-12-07

## 2020-12-02 RX ORDER — ZOLPIDEM TARTRATE 5 MG/1
5 TABLET ORAL NIGHTLY PRN
Status: DISCONTINUED | OUTPATIENT
Start: 2020-12-02 | End: 2020-12-07

## 2020-12-02 RX ORDER — ONDANSETRON 2 MG/ML
4 INJECTION INTRAMUSCULAR; INTRAVENOUS ONCE AS NEEDED
Status: DISCONTINUED | OUTPATIENT
Start: 2020-12-02 | End: 2020-12-02 | Stop reason: HOSPADM

## 2020-12-02 RX ORDER — VANCOMYCIN HYDROCHLORIDE
15 EVERY 12 HOURS
Status: DISCONTINUED | OUTPATIENT
Start: 2020-12-03 | End: 2020-12-04

## 2020-12-02 RX ORDER — PHENYLEPHRINE HCL 10 MG/ML
VIAL (ML) INJECTION AS NEEDED
Status: DISCONTINUED | OUTPATIENT
Start: 2020-12-02 | End: 2020-12-02 | Stop reason: SURG

## 2020-12-02 RX ORDER — HYDROMORPHONE HYDROCHLORIDE 1 MG/ML
0.4 INJECTION, SOLUTION INTRAMUSCULAR; INTRAVENOUS; SUBCUTANEOUS EVERY 5 MIN PRN
Status: DISCONTINUED | OUTPATIENT
Start: 2020-12-02 | End: 2020-12-02 | Stop reason: HOSPADM

## 2020-12-02 RX ORDER — HYDROCODONE BITARTRATE AND ACETAMINOPHEN 5; 325 MG/1; MG/1
1 TABLET ORAL AS NEEDED
Status: DISCONTINUED | OUTPATIENT
Start: 2020-12-02 | End: 2020-12-02 | Stop reason: HOSPADM

## 2020-12-02 RX ORDER — MORPHINE SULFATE 4 MG/ML
4 INJECTION, SOLUTION INTRAMUSCULAR; INTRAVENOUS EVERY 10 MIN PRN
Status: DISCONTINUED | OUTPATIENT
Start: 2020-12-02 | End: 2020-12-02 | Stop reason: HOSPADM

## 2020-12-02 RX ORDER — FAMOTIDINE 20 MG/1
20 TABLET ORAL 2 TIMES DAILY
Status: DISCONTINUED | OUTPATIENT
Start: 2020-12-02 | End: 2020-12-02

## 2020-12-02 RX ORDER — DIPHENHYDRAMINE HCL 25 MG
25 CAPSULE ORAL EVERY 4 HOURS PRN
Status: DISCONTINUED | OUTPATIENT
Start: 2020-12-02 | End: 2020-12-07

## 2020-12-02 RX ORDER — HYDROMORPHONE HYDROCHLORIDE 1 MG/ML
1.2 INJECTION, SOLUTION INTRAMUSCULAR; INTRAVENOUS; SUBCUTANEOUS EVERY 2 HOUR PRN
Status: DISCONTINUED | OUTPATIENT
Start: 2020-12-02 | End: 2020-12-07

## 2020-12-02 RX ORDER — BISACODYL 10 MG
10 SUPPOSITORY, RECTAL RECTAL
Status: DISCONTINUED | OUTPATIENT
Start: 2020-12-02 | End: 2020-12-07

## 2020-12-02 RX ORDER — DIPHENHYDRAMINE HYDROCHLORIDE 50 MG/ML
12.5 INJECTION INTRAMUSCULAR; INTRAVENOUS EVERY 4 HOURS PRN
Status: DISCONTINUED | OUTPATIENT
Start: 2020-12-02 | End: 2020-12-07

## 2020-12-02 RX ORDER — VANCOMYCIN HYDROCHLORIDE
1500 ONCE
Status: COMPLETED | OUTPATIENT
Start: 2020-12-02 | End: 2020-12-02

## 2020-12-02 RX ORDER — CEFAZOLIN SODIUM/WATER 2 G/20 ML
2 SYRINGE (ML) INTRAVENOUS ONCE
Status: COMPLETED | OUTPATIENT
Start: 2020-12-02 | End: 2020-12-02

## 2020-12-02 RX ORDER — MORPHINE SULFATE 4 MG/ML
2 INJECTION, SOLUTION INTRAMUSCULAR; INTRAVENOUS EVERY 10 MIN PRN
Status: DISCONTINUED | OUTPATIENT
Start: 2020-12-02 | End: 2020-12-02 | Stop reason: HOSPADM

## 2020-12-02 RX ORDER — SODIUM CHLORIDE, SODIUM LACTATE, POTASSIUM CHLORIDE, CALCIUM CHLORIDE 600; 310; 30; 20 MG/100ML; MG/100ML; MG/100ML; MG/100ML
INJECTION, SOLUTION INTRAVENOUS CONTINUOUS PRN
Status: DISCONTINUED | OUTPATIENT
Start: 2020-12-02 | End: 2020-12-02 | Stop reason: SURG

## 2020-12-02 RX ORDER — SODIUM PHOSPHATE, DIBASIC AND SODIUM PHOSPHATE, MONOBASIC 7; 19 G/133ML; G/133ML
1 ENEMA RECTAL ONCE AS NEEDED
Status: DISCONTINUED | OUTPATIENT
Start: 2020-12-02 | End: 2020-12-07

## 2020-12-02 RX ORDER — SODIUM CHLORIDE 9 MG/ML
INJECTION, SOLUTION INTRAVENOUS CONTINUOUS
Status: DISCONTINUED | OUTPATIENT
Start: 2020-12-02 | End: 2020-12-06

## 2020-12-02 RX ORDER — OXYCODONE HCL 10 MG/1
10 TABLET, FILM COATED, EXTENDED RELEASE ORAL ONCE
Status: COMPLETED | OUTPATIENT
Start: 2020-12-02 | End: 2020-12-02

## 2020-12-02 RX ORDER — DOCUSATE SODIUM 100 MG/1
100 CAPSULE, LIQUID FILLED ORAL 2 TIMES DAILY
Status: DISCONTINUED | OUTPATIENT
Start: 2020-12-02 | End: 2020-12-07

## 2020-12-02 RX ORDER — VANCOMYCIN HYDROCHLORIDE
15 EVERY 12 HOURS
Status: DISCONTINUED | OUTPATIENT
Start: 2020-12-03 | End: 2020-12-02 | Stop reason: ALTCHOICE

## 2020-12-02 RX ORDER — PROCHLORPERAZINE EDISYLATE 5 MG/ML
10 INJECTION INTRAMUSCULAR; INTRAVENOUS EVERY 6 HOURS PRN
Status: ACTIVE | OUTPATIENT
Start: 2020-12-02 | End: 2020-12-04

## 2020-12-02 RX ORDER — HALOPERIDOL 5 MG/ML
0.25 INJECTION INTRAMUSCULAR ONCE AS NEEDED
Status: DISCONTINUED | OUTPATIENT
Start: 2020-12-02 | End: 2020-12-02 | Stop reason: HOSPADM

## 2020-12-02 RX ORDER — HYDROMORPHONE HYDROCHLORIDE 1 MG/ML
0.6 INJECTION, SOLUTION INTRAMUSCULAR; INTRAVENOUS; SUBCUTANEOUS EVERY 5 MIN PRN
Status: DISCONTINUED | OUTPATIENT
Start: 2020-12-02 | End: 2020-12-02 | Stop reason: HOSPADM

## 2020-12-02 RX ORDER — CYCLOBENZAPRINE HCL 10 MG
10 TABLET ORAL EVERY 8 HOURS PRN
Status: DISCONTINUED | OUTPATIENT
Start: 2020-12-02 | End: 2020-12-07

## 2020-12-02 RX ORDER — METOPROLOL TARTRATE 5 MG/5ML
2.5 INJECTION INTRAVENOUS ONCE
Status: DISCONTINUED | OUTPATIENT
Start: 2020-12-02 | End: 2020-12-02 | Stop reason: HOSPADM

## 2020-12-02 RX ORDER — DEXTROSE MONOHYDRATE 25 G/50ML
50 INJECTION, SOLUTION INTRAVENOUS
Status: DISCONTINUED | OUTPATIENT
Start: 2020-12-02 | End: 2020-12-02 | Stop reason: HOSPADM

## 2020-12-02 RX ORDER — POLYETHYLENE GLYCOL 3350 17 G/17G
17 POWDER, FOR SOLUTION ORAL DAILY PRN
Status: DISCONTINUED | OUTPATIENT
Start: 2020-12-02 | End: 2020-12-07

## 2020-12-02 RX ORDER — HYDROMORPHONE HYDROCHLORIDE 1 MG/ML
0.8 INJECTION, SOLUTION INTRAMUSCULAR; INTRAVENOUS; SUBCUTANEOUS EVERY 2 HOUR PRN
Status: DISCONTINUED | OUTPATIENT
Start: 2020-12-02 | End: 2020-12-07

## 2020-12-02 RX ORDER — SODIUM CHLORIDE, SODIUM LACTATE, POTASSIUM CHLORIDE, CALCIUM CHLORIDE 600; 310; 30; 20 MG/100ML; MG/100ML; MG/100ML; MG/100ML
INJECTION, SOLUTION INTRAVENOUS CONTINUOUS
Status: DISCONTINUED | OUTPATIENT
Start: 2020-12-02 | End: 2020-12-02 | Stop reason: HOSPADM

## 2020-12-02 RX ORDER — HYDROMORPHONE HYDROCHLORIDE 1 MG/ML
0.2 INJECTION, SOLUTION INTRAMUSCULAR; INTRAVENOUS; SUBCUTANEOUS EVERY 5 MIN PRN
Status: DISCONTINUED | OUTPATIENT
Start: 2020-12-02 | End: 2020-12-02 | Stop reason: HOSPADM

## 2020-12-02 RX ORDER — MORPHINE SULFATE 10 MG/ML
6 INJECTION, SOLUTION INTRAMUSCULAR; INTRAVENOUS EVERY 10 MIN PRN
Status: DISCONTINUED | OUTPATIENT
Start: 2020-12-02 | End: 2020-12-02 | Stop reason: HOSPADM

## 2020-12-02 RX ORDER — NALOXONE HYDROCHLORIDE 0.4 MG/ML
80 INJECTION, SOLUTION INTRAMUSCULAR; INTRAVENOUS; SUBCUTANEOUS AS NEEDED
Status: DISCONTINUED | OUTPATIENT
Start: 2020-12-02 | End: 2020-12-02 | Stop reason: HOSPADM

## 2020-12-02 RX ORDER — HYDROMORPHONE HYDROCHLORIDE 1 MG/ML
0.4 INJECTION, SOLUTION INTRAMUSCULAR; INTRAVENOUS; SUBCUTANEOUS EVERY 2 HOUR PRN
Status: DISCONTINUED | OUTPATIENT
Start: 2020-12-02 | End: 2020-12-07

## 2020-12-02 RX ORDER — ONDANSETRON 2 MG/ML
4 INJECTION INTRAMUSCULAR; INTRAVENOUS EVERY 4 HOURS PRN
Status: DISCONTINUED | OUTPATIENT
Start: 2020-12-02 | End: 2020-12-07

## 2020-12-02 RX ORDER — FAMOTIDINE 10 MG/ML
20 INJECTION, SOLUTION INTRAVENOUS 2 TIMES DAILY
Status: DISCONTINUED | OUTPATIENT
Start: 2020-12-02 | End: 2020-12-02

## 2020-12-02 RX ORDER — HYDROCODONE BITARTRATE AND ACETAMINOPHEN 5; 325 MG/1; MG/1
2 TABLET ORAL AS NEEDED
Status: DISCONTINUED | OUTPATIENT
Start: 2020-12-02 | End: 2020-12-02 | Stop reason: HOSPADM

## 2020-12-02 RX ORDER — LIDOCAINE HYDROCHLORIDE 10 MG/ML
INJECTION, SOLUTION EPIDURAL; INFILTRATION; INTRACAUDAL; PERINEURAL AS NEEDED
Status: DISCONTINUED | OUTPATIENT
Start: 2020-12-02 | End: 2020-12-02 | Stop reason: SURG

## 2020-12-02 RX ADMIN — LIDOCAINE HYDROCHLORIDE 50 MG: 10 INJECTION, SOLUTION EPIDURAL; INFILTRATION; INTRACAUDAL; PERINEURAL at 15:04:00

## 2020-12-02 RX ADMIN — PHENYLEPHRINE HCL 100 MCG: 10 MG/ML VIAL (ML) INJECTION at 16:23:00

## 2020-12-02 RX ADMIN — CEFAZOLIN SODIUM/WATER 2 G: 2 G/20 ML SYRINGE (ML) INTRAVENOUS at 15:40:00

## 2020-12-02 RX ADMIN — PHENYLEPHRINE HCL 100 MCG: 10 MG/ML VIAL (ML) INJECTION at 16:39:00

## 2020-12-02 RX ADMIN — PHENYLEPHRINE HCL 100 MCG: 10 MG/ML VIAL (ML) INJECTION at 16:15:00

## 2020-12-02 RX ADMIN — PHENYLEPHRINE HCL 100 MCG: 10 MG/ML VIAL (ML) INJECTION at 16:30:00

## 2020-12-02 RX ADMIN — PHENYLEPHRINE HCL 100 MCG: 10 MG/ML VIAL (ML) INJECTION at 16:50:00

## 2020-12-02 RX ADMIN — SODIUM CHLORIDE, SODIUM LACTATE, POTASSIUM CHLORIDE, CALCIUM CHLORIDE: 600; 310; 30; 20 INJECTION, SOLUTION INTRAVENOUS at 17:11:00

## 2020-12-02 RX ADMIN — SODIUM CHLORIDE, SODIUM LACTATE, POTASSIUM CHLORIDE, CALCIUM CHLORIDE: 600; 310; 30; 20 INJECTION, SOLUTION INTRAVENOUS at 14:58:00

## 2020-12-02 NOTE — PROGRESS NOTES
INFECTIOUS DISEASE PROGRESS NOTE  [facility]    Catia Still Case Patient Status:  Inpatient    1950 MRN [id number]   Location [facility] 4W/SW/SE Attending Bennie Matta MD   Hosp Day # 2 PCP No 43-year-old male with a history of obesity, CKD, A. fib, HTN, diabetes with bilateral TKA and previous right knee PJI who now presents to hospital for management of right knee infection implant surgery.   History of MRSA infection in the hand in 2008, compl - OR 12/19/19 for R knee MARIAA and spacer with cx MRSA - (S- vanco, linezoid, clinda, dapto, rifampin; R-bactrim, tetracycline) - s/p IV vancomycin x6 weeks until mid-Jan 2020               - R knee infection with quad repair 4/23/19 - s/p minoc

## 2020-12-02 NOTE — PROGRESS NOTES
[place] [facility]    Progress Note    Turner Misty Case Patient Status:  Inpatient    1950 MRN [id number]   Location [facility] 4W/SW/SE Attending Debby Esquivel MD   [facility] Day # 1 PCP No primary care provider on file.        SUBJECT Finalized by (CST): Belle Rodriguez MD on 11/30/2020 at 9:01 PM            Meds:     •  amLODIPine Besylate (NORVASC) tab 5 mg, 5 mg, Oral, Daily    •  atorvastatin (LIPITOR) tab 10 mg, 10 mg, Oral, Nightly    •  HYDROcodone-acetaminophen ([facility])  M (NOVOLOG) 100 UNIT/ML flexpen 1-7 Units, 1-7 Units, Subcutaneous, TID CC    •  collagenase (SANTYL) 250 UNIT/GM ointment, , Topical, Q shift    •  mupirocin (BACTROBAN) 2% nasal ointment OINT 1 Application, 1 Application, Each Nare, BID    •  Insulin Aspar

## 2020-12-02 NOTE — OPERATIVE REPORT
[facility] - [facility]    Revision TKA Operative Note    Ever Root Case Patient Status:  Inpatient    1950 MRN [id number]   Location [place] [address] Attending Heena Cole MD     PCP No primary care provider on file. but are not limited to: infection, nerve injury (peroneal palsy and superficial numbness specifically), vessel damage, VTE, need for re-operation, knee stiffness, loss of limb and loss of life.   Furthermore this is a revision procedure and the patient unde was done we found, his implants were stable but there was clearly purulence coming from the knee joint creating holes in the capsule.       PROCEDURE:    Once exposed and the knee fully assessed we then attempted to address the pathology.    At this point t note all needle and sponge counts were correct in the operating room. I was present and scrubbed for the entire procedure. PLAN:    Postoperatively the patient will be WBAT. They will be placed on DVT prophylaxis for 2 weeks.   Cultures will be followe

## 2020-12-02 NOTE — PLAN OF CARE
Problem: Patient Centered Care  Goal: Patient preferences are identified and integrated in the patient's plan of care  Description: Interventions:  - What would you like us to know as we care for you?  I have had surgery on my knee before  - Provide timel neutropenic period  Description: INTERVENTIONS  - Monitor WBC  - Administer growth factors as ordered  - Implement neutropenic guidelines  Outcome: Progressing     Problem: SAFETY ADULT - FALL  Goal: Free from fall injury  Description: INTERVENTIONS:  - As provider's orders  - Instruct and reinforce with patient and family use of appropriate assistive device and precautions (e.g. spinal or hip dislocation precautions)  Outcome: Progressing     Pt a/ox4, VS stable. Pain control with prn oxy ir.  NPO tonight fo

## 2020-12-02 NOTE — OPERATIVE REPORT
[place] [facility] - [facility]    Revision TKA Brief Operative Note    Osmel Monroy Case Patient Status:  Inpatient    1950 MRN [id number]   Location [place] [address] Attending Red Whitmore MD     PCP No primary care provider on file.

## 2020-12-02 NOTE — ANESTHESIA PREPROCEDURE EVALUATION
Anesthesia PreOp Note    HPI:     Servando Horne Case is a 65year old male who presents for preoperative consultation requested by: Jasmin Ayala MD    Date of Surgery: 11/30/2020 - 12/2/2020    Procedure(s):  EXTREMITY LOWER IRRIGATION & DEBRIDEMENT  Indic Rfl: , 11/29/2020 at 800    •  HYDROcodone-acetaminophen  MG Oral Tab, Take 1 tablet by mouth every 6 (six) hours as needed for Pain., Disp: , Rfl:     •  Insulin Aspart Pen 100 UNIT/ML Subcutaneous Solution Pen-injector, Inject 10 Units into the ski at 12/02/20 0913    •  [MAR Hold] torsemide (DEMADEX) tab 20 mg, 20 mg, Oral, Daily, Shaan Batista MD, 20 mg at 12/01/20 0827    •  [MAR Hold] glucose (DEX4) oral liquid 15 g, 15 g, Oral, Q15 Min PRN, Shaan Batista MD    Or    •  [MAR Hold] Glucose-V mupirocin (BACTROBAN) 2% nasal ointment OINT 1 Application, 1 Application, Each Nare, BID, Lo Mitchell MD, 1 Application at 61/73/36 0900    No current [facility]-ordered outpatient medications on file.         Gabapentin              HIVES  Lyrica [Pregabal RDW 13.5 12/02/2020    .0 12/02/2020     Lab Results   Component Value Date     12/02/2020    K 4.5 12/02/2020     12/02/2020    CO2 31.0 12/02/2020    BUN 17 12/02/2020    CREATSERUM 0.97 12/02/2020     (H) 12/02/2020    PGLU 200

## 2020-12-02 NOTE — ANESTHESIA POSTPROCEDURE EVALUATION
Patient: Lisbeth Nissen Case    Procedure Summary     Date: 12/02/20 Room / Location: [facility] MAIN OR 11 / 300 [facility] MAIN OR    Anesthesia Start: 3211 Anesthesia Stop: 8740    Procedure: EXTREMITY LOWER IRRIGATION & DEBRIDEMENT (Right ) Diagnosis: (infected right knee)

## 2020-12-02 NOTE — ANESTHESIA PROCEDURE NOTES
Airway  Date/Time: 12/2/2020 3:11 PM  Urgency: Elective    Airway not difficult    General Information and Staff    Patient location during procedure: OR  Anesthesiologist: Myron Quinn MD  Resident/CRNA: Kiesha Dueñas CRNA  Performed: CRNA     Indicat

## 2020-12-02 NOTE — PROGRESS NOTES
[place] [facility] - [facility]    Progress Note    Danitadesiree Still Case Patient Status:  Inpatient    1950 MRN [id number]   Location [facility] 4W/SW/SE Attending Morgan Butler MD   [facility] Day # 2 PCP No primary care provider on file.        SUBJECT Arlin Dunn MD on 11/30/2020 at 8:59 PM     Finalized by (CST): Marleen Kamara MD on 11/30/2020 at 9:01 PM            Meds:     •  0.9% NaCl infusion, , Intravenous, Continuous    •  Insulin Regular Human (NOVOLIN R) 100 UNIT/ML injection 1-7 Units, 1-7 Units, Marroquin Metoclopramide HCl (REGLAN) injection 5 mg, 5 mg, Intravenous, Q8H PRN    •  collagenase (SANTYL) 250 UNIT/GM ointment, , Topical, Q shift    •  mupirocin (BACTROBAN) 2% nasal ointment OINT 1 Application, 1 Application, Each Nare, BID          Assessment

## 2020-12-02 NOTE — PROGRESS NOTES
INFECTIOUS DISEASE PROGRESS NOTE  [facility]    Briana Mora Case Patient Status:  Inpatient    1950 MRN [id number]   Location [facility] 4W/SW/SE Attending Suzan Del Toro MD   Hosp Day # 2 PCP No 75-year-old male with a history of obesity, CKD, A. fib, HTN, diabetes with bilateral TKA and previous right knee PJI who now presents to hospital for management of right knee infection implant surgery.   History of MRSA infection in the hand in 2008, compl - OR 12/19/19 for R knee MARIAA and spacer with cx MRSA - (S- vanco, linezoid, clinda, dapto, rifampin; R-bactrim, tetracycline) - s/p IV vancomycin x6 weeks until mid-Jan 2020               - R knee infection with quad repair 4/23/19 - s/p minoc

## 2020-12-03 ENCOUNTER — APPOINTMENT (OUTPATIENT)
Dept: MRI IMAGING | Facility: HOSPITAL | Age: 70
DRG: 464 | End: 2020-12-03
Attending: HOSPITALIST
Payer: MEDICARE

## 2020-12-03 PROCEDURE — 99233 SBSQ HOSP IP/OBS HIGH 50: CPT | Performed by: HOSPITALIST

## 2020-12-03 PROCEDURE — 73718 MRI LOWER EXTREMITY W/O DYE: CPT | Performed by: HOSPITALIST

## 2020-12-03 RX ORDER — ATORVASTATIN CALCIUM 10 MG/1
10 TABLET, FILM COATED ORAL NIGHTLY
Status: DISCONTINUED | OUTPATIENT
Start: 2020-12-03 | End: 2020-12-07

## 2020-12-03 RX ORDER — DEXTROSE MONOHYDRATE 25 G/50ML
50 INJECTION, SOLUTION INTRAVENOUS
Status: DISCONTINUED | OUTPATIENT
Start: 2020-12-03 | End: 2020-12-07

## 2020-12-03 RX ORDER — MAGNESIUM OXIDE 400 MG (241.3 MG MAGNESIUM) TABLET
400 TABLET ONCE
Status: COMPLETED | OUTPATIENT
Start: 2020-12-03 | End: 2020-12-03

## 2020-12-03 RX ORDER — AMLODIPINE BESYLATE 5 MG/1
5 TABLET ORAL DAILY
Status: DISCONTINUED | OUTPATIENT
Start: 2020-12-03 | End: 2020-12-07

## 2020-12-03 NOTE — PROGRESS NOTES
120 [facility] note: Duplicate Proton Pump Inhibitor with Histamine 2 blocker. Jerrod Ambrosio Case is a 65year old patient who has been prescribed both famotidine (Pepcid)  And pantoprazole (Protonix).   Pepcid was discontinued per [employer] approved protocol for du

## 2020-12-03 NOTE — PLAN OF CARE
Problem: Patient Centered Care  Goal: Patient preferences are identified and integrated in the patient's plan of care  Description: Interventions:  - What would you like us to know as we care for you?  I have had surgery on my knee before  - Provide timel neutropenic period  Description: INTERVENTIONS  - Monitor WBC  - Administer growth factors as ordered  - Implement neutropenic guidelines  Outcome: Progressing     Problem: SAFETY ADULT - FALL  Goal: Free from fall injury  Description: INTERVENTIONS:  - As provider's orders  - Instruct and reinforce with patient and family use of appropriate assistive device and precautions (e.g. spinal or hip dislocation precautions)  Outcome: Progressing     Out of bed, ambulate, encourage incentive spirometer, pain contro

## 2020-12-03 NOTE — IMAGING NOTE
1510  Patient present in MRI holding area. [employer] pacemaker representative present. Pacemaker MRI safe mode programed. VSS.   HR 84  /69  SPO2 96%    1525  MRI SCAN BEGINS    1535  VSS  HR 84  /76  SPO2 93%    1545  VSS  HR 85  /78  SPO

## 2020-12-03 NOTE — WOUND PROGRESS NOTE
A wound consult was placed by the ortho NP for \"right foot\". A podiatry consult has been previously placed, Dr. Epperson Cons debrided the foot on 11/30. Please see notes.

## 2020-12-03 NOTE — PROGRESS NOTES
[place] [facility]    Progress Note    Morgan Cash Case Patient Status:  Inpatient    1950 MRN [id number]   Location [facility] 4W/SW/SE Attending Hao Fernandez MD   [facility] Day # 3 PCP No primary care provider on file.        SUBJECT the foot can further characterize if clinically indicated.       Dictated by (CST): Tutu Spann MD on 11/30/2020 at 8:59 PM     Finalized by (CST): Tutu Spann MD on 11/30/2020 at 9:01 PM          Xr Knee (1 Or 2 Views), Right (cpt=73560)    Result Date: 100 mg, Oral, BID    •  PEG 3350 (MIRALAX) powder packet 17 g, 17 g, Oral, Daily PRN    •  magnesium hydroxide (MILK OF MAGNESIA) 400 MG/5ML suspension 30 mL, 30 mL, Oral, Daily PRN    •  bisacodyl (DULCOLAX) rectal suppository 10 mg, 10 mg, Rectal, Daily replacement of right knee joint  - s/p revision rt.  Total knee arthroplasty-- PE liner exchange by Dr Dejan Luna 12/2  - cont pain meds  - he was told to hold his eliquis in anticipation for surgery  - pt/ot   - sw to help with d/c plans  - resume post op anti

## 2020-12-03 NOTE — PLAN OF CARE
Pt received to the unit from PACU in stable condition. VSS, no acute events overnight. Pt is aox4, ambulating weight-bearing-as-tolerated, immobilizer in place at all times. Voiding per trejo to be removed in AM. Eliquis and SCD to RLE for DVT prophylaxis. assistance  - Assess pain using appropriate pain scale  - Administer analgesics based on type and severity of pain and evaluate response  - Implement non-pharmacological measures as appropriate and evaluate response  - Consider cultural and social influenc ADULT  Goal: Return mobility to safest level of function  Description: INTERVENTIONS:  - Assess patient stability and activity tolerance for standing, transferring and ambulating w/ or w/o assistive devices  - Assist with transfers and ambulation using saf

## 2020-12-03 NOTE — PROGRESS NOTES
INFECTIOUS DISEASE PROGRESS NOTE  [facility]    Lisbeth Nissen Case Patient Status:  Inpatient    1950 MRN [id number]   Location [facility] 4W/SW/SE Attending Camilla Jang MD   Hosp Day # 3 PCP No 70-year-old male with a history of obesity, CKD, A. fib, HTN, diabetes with bilateral TKA and previous right knee PJI who now presents to hospital for management of right knee infection implant surgery.   History of MRSA infection in the hand in 2008, compl - OR 12/19/19 for R knee MARIAA and spacer with cx MRSA - (S- vanco, linezoid, clinda, dapto, rifampin; R-bactrim, tetracycline) - s/p IV vancomycin x6 weeks until mid-Jan 2020               - R knee infection with quad repair 4/23/19 - s/p minoc

## 2020-12-03 NOTE — PHYSICAL THERAPY NOTE
PHYSICAL THERAPY KNEE EVALUATION - INPATIENT       Room Number: 421/421-A  Evaluation Date: 12/3/2020  Type of Evaluation: Initial  Physician Order: PT Eval and Treat    Presenting Problem: R TKR revision (with immobilizer)  Reason for Therapy: Mobility Dy deficits in preparation for discharge. DISCHARGE RECOMMENDATIONS  PT Discharge Recommendations: Home with home health PT(family assist)    PLAN  PT Treatment Plan: Bed mobility; Body mechanics; Energy conservation;Patient education; Family education;Gait t 5  Location: RLE  Management Techniques: Activity promotion;Breathing techniques; Body mechanics; Relaxation;Repositioning    COGNITION  · Overall Cognitive Status:  WFL - within functional limits    RANGE OF MOTION AND STRENGTH ASSESSMENT  Upper extremity R training    Patient End of Session: With [facility] staff;Needs met;Call light within reach;RN aware of session/findings; All patient questions and concerns addressed; Alarm set; In bed    CURRENT GOALS    Goals to be met by: 2 wks  Patient Goal Patient's self-stated

## 2020-12-03 NOTE — PROGRESS NOTES
120 [facility] Dosing Service    Initial Pharmacokinetic Consult for Vancomycin Dosing     Deny March is a 65year old patient who is being treated for  joint infection .   Pharmacy has been asked to dose Vancomycin by Dr. Heri Mckeon:  Evan Greer weight of 97 kg and renal function. 2.  Vancomycin trough will be obtained prior to the 5th dose. Goal trough level 15-20 ug/mL. 3.  Will monitor SCr daily while on Vancomycin to assess renal function.     Pharmacy will continue to follow and adjust

## 2020-12-03 NOTE — PHYSICAL THERAPY NOTE
PT PM session: Pt in bed requesting rest in bed to allow transport ease for a pending MRI that is scheduled this PM. Pt was assisted with repositioning and bed mobility for comfort with min A.  All LE therex was reviewed with quad sets and ankle pumps x 10

## 2020-12-03 NOTE — CM/SW NOTE
CM received MDO for dc planning. Pt had Planned I& D day by Dr. Cory Bloch. [facility] ID team following. OR cx pending. [facility]. Will need final rx for home infusion. Jack Walls.  Gentry Stover RN, BSN  Nurse   [phone]

## 2020-12-03 NOTE — RESPIRATORY THERAPY NOTE
Patient states he has a history of MIGUELITO. Patient asked if he was interested in wearing hospital CPAP, pt refused.

## 2020-12-03 NOTE — OCCUPATIONAL THERAPY NOTE
OT made two attempts to work with patient today.  The first attempt patient was off the floor for MRI- the 2nd attempt, patient declined- he was frustrated Re: not having his initial MRI and having to be transported back to the floor (was rescheduled for la

## 2020-12-03 NOTE — PROGRESS NOTES
[facility]    Progress Note    Jerrod Ambrosio Case Patient Status:  Inpatient    1950 MRN [id number]   Location [facility] 4W/SW/SE Attending Kira Ojeda MD   Hosp Day # 3 PCP No primary care provider on file.        Subjectiv 11/30/2020    ALT 10 (L) 11/30/2020    INR 1.22 (H) 11/30/2020    ESRML 127 (H) 11/30/2020    CRP 13.90 (H) 11/30/2020       Xr Knee (1 Or 2 Views), Right (cpt=73560)    Result Date: 12/2/2020  CONCLUSION:  1.  Anatomically aligned right total knee arthropl

## 2020-12-04 PROCEDURE — 99232 SBSQ HOSP IP/OBS MODERATE 35: CPT | Performed by: HOSPITALIST

## 2020-12-04 PROCEDURE — 0JBQ0ZZ EXCISION OF RIGHT FOOT SUBCUTANEOUS TISSUE AND FASCIA, OPEN APPROACH: ICD-10-PCS | Performed by: PODIATRIST

## 2020-12-04 RX ORDER — POLYETHYLENE GLYCOL 3350 17 G/17G
17 POWDER, FOR SOLUTION ORAL DAILY
Status: DISCONTINUED | OUTPATIENT
Start: 2020-12-04 | End: 2020-12-07

## 2020-12-04 RX ORDER — MELATONIN
325
Status: DISCONTINUED | OUTPATIENT
Start: 2020-12-04 | End: 2020-12-07

## 2020-12-04 RX ORDER — VANCOMYCIN HYDROCHLORIDE
15 EVERY 12 HOURS
Status: DISCONTINUED | OUTPATIENT
Start: 2020-12-05 | End: 2020-12-07

## 2020-12-04 NOTE — PROGRESS NOTES
[facility] HOSP - [facility]    Report of Consultation    Kahlil Larson Case Patient Status:  Inpatient    1950 MRN [id number]   Location [facility] 4W/SW/SE Attending Argenis Bright MD   Hosp Day # 4 PCP No primary care provider on file.      Babatunde Figueroa 0.9% NaCl infusion, , Intravenous, Continuous    •  HYDROmorphone HCl (DILAUDID) 1 MG/ML injection 0.4 mg, 0.4 mg, Intravenous, Q2H PRN    Or    •  HYDROmorphone HCl (DILAUDID) 1 MG/ML injection 0.8 mg, 0.8 mg, Intravenous, Q2H PRN    Or    •  HYDROmorphon PRN    Or    •  Glucose-Vitamin C (DEX-4) chewable tab 8 tablet, 8 tablet, Oral, Q15 Min PRN    •  Normal Saline Flush 0.9 % injection 3 mL, 3 mL, Intravenous, PRN    •  collagenase (SANTYL) 250 UNIT/GM ointment, , Topical, Q shift    •  mupirocin (BACTROB (1.88 m), weight 261 lb 8 oz (118.6 kg), SpO2 99 %.     VASCULAR:   Right Foot   DP pulse 0/4  PT pulse 1/4  Edema: present to midfoot and ankle, moderate +1 pitting  Capillary refill time < 3 sec x 5                     Left Foot   DP pulse 1/4        PT PTP 15.2 (H) 11/30/2020    ESRML 127 (H) 11/30/2020    CRP 13.90 (H) 11/30/2020    MG 1.7 12/03/2020         Imaging:  Xr Knee (1 Or 2 Views), Right (cpt=73560)    Result Date: 12/2/2020  CONCLUSION:  1. Anatomically aligned right total knee arthroplasty. noted.   MRI reviewed. D/W Dr Diana Duffy and he related the study without contrast is limited and may just be a fluid collection and not an abcess. Clinical correlation needed. After debridement no probing and no abcess noted.     Discussed with pt at length Carmen Pry

## 2020-12-04 NOTE — CDS QUERY
.How to Answer this Query    1.) Click \"Edit\" button on the toolbar.  2.) Type an \"X\" in the bracket for the diagnosis that applies. (You may also add additional clinical details as you feel necessary to substantiate your response).    3.) Finally click      If you have any questions, please contact Clinical :  Guanako Hughes RN at [phone] / [phone]     Thank You!     THIS FORM IS A PERMANENT PART OF THE MEDICAL RECORD

## 2020-12-04 NOTE — PROGRESS NOTES
[facility]    Progress Note    Pedro Luis Vance Case Patient Status:  Inpatient    1950 MRN [id number]   Location [facility] 4W/SW/SE Attending Ronold Castleman, MD   Hosp Day # 4 PCP No primary care provider on file.        Subjectiv HCT 23.0 (L) 12/03/2020    HCT 23.0 (L) 12/03/2020    .0 12/03/2020    .0 12/03/2020    CREATSERUM 0.86 12/03/2020    BUN 14 12/03/2020     12/03/2020    K 4.2 12/03/2020     12/03/2020    CO2 27.0 12/03/2020     (H) 12/

## 2020-12-04 NOTE — PROGRESS NOTES
[facility] dosing service    Follow-up Pharmacokinetic Consult for Vancomycin Dosing     Deny RETANA Case is a 65year old patient who is being treated for cellulitis. Patient is on day 3 of Vancomycin and is currently receiving 1.5 gm IV Q 12 hours.   Go unspecified   Result Value Ref Range    Anaerobic Culture Pending N/A   3. BODY FLUID CULT,AEROBIC AND ANAEROBIC     Status: Abnormal (Preliminary result)    Collection Time: 12/02/20  3:43 PM    Specimen: Knee, right;  Body fluid, unspecified   Result Valu

## 2020-12-04 NOTE — OCCUPATIONAL THERAPY NOTE
OCCUPATIONAL THERAPY EVALUATION - INPATIENT     Room Number: 421/421-A  Evaluation Date: 12/4/2020  Type of Evaluation: Initial  Presenting Problem: (R TKR with immobilizer)    Physician Order: IP Consult to Occupational Therapy  Reason for Therapy: ADL/ Laterality Date   • EXTREMITY LOWER IRRIGATION & DEBRIDEMENT Right 12/2/2020    Performed by Jennyfer Garcia MD at [facility] OR   • [address] Right 12/2/2020    Performed by Jennyfer Garcia MD at [address] [place] [address]  Type of Home: H assist  Sit to Stand: Supervision  Toilet Transfer: supervision  Shower Transfer: NT  Chair Transfer: supervision    Bedroom Mobility: supervision withRW    BALANCE ASSESSMENT  Static Sitting: good  Dynamic Sitting: good  Static Standing: fair  Dynamic Sta

## 2020-12-04 NOTE — PROGRESS NOTES
[facility]    Progress Note    Pam Norman Case Patient Status:  Inpatient    1950 MRN [id number]   Location [facility] 4W/SW/SE Attending Cheryl Whitt MD   [facility] Day # 4 PCP No primary care provider on file.        SUBJECT soft tissues immediately deep to the skin and soft tissue ulceration. Ossification of the proximal central band of the plantar fascia consistent with remote plantar fasciitis, similar to prior radiograph. Moderate midfoot and forefoot osteoarthritis.   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  Total knee arthroplasty-- PE liner exchan

## 2020-12-04 NOTE — WOUND PROGRESS NOTE
Pt seen sitting up in the chair, agreeable to right foot assessment. Pt states Dr. Eren Oliveros had just finished debriding the dm ulcer. The dressing was removed, sanguinous drainage noted. Wound cleansed with saline, traced and measured.  Wound base is moist a

## 2020-12-04 NOTE — PHYSICAL THERAPY NOTE
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

## 2020-12-04 NOTE — PLAN OF CARE
Pt a/o x4. Vital signs stable, room air, cms intact. No signs of acute distress. Complaining of moderate pain. Pain managed with PRN MS Contin and Oxy IR. Pt on tele. No calls. Pt ambulating standby with a walker. Voiding freely.  R side ROSE dressing clean social influences on pain and pain management  - Manage/alleviate anxiety  - Utilize distraction and/or relaxation techniques  - Monitor for opioid side effects  - Notify MD/LIP if interventions unsuccessful or patient reports new pain  - Anticipate increa using safe patient handling equipment as needed  - Ensure adequate protection for wounds/incisions during mobilization  - Obtain PT/OT consults as needed  - Advance activity as appropriate  - Communicate ordered activity level and limitations with patient/

## 2020-12-04 NOTE — PROGRESS NOTES
INFECTIOUS DISEASE PROGRESS NOTE  [facility] [place] [facility]    Sergio Madison Case Patient Status:  Inpatient    1950 MRN [id number]   Location [facility] 4W/SW/SE Attending Cady Euceda MD   Hosp Day # 4 PCP No 68-year-old male with a history of obesity, CKD, A. fib, HTN, diabetes with bilateral TKA and previous right knee PJI who now presents to hospital for management of right knee infection implant surgery.   History of MRSA infection in the hand in 2008, compl - OR 12/19/19 for R knee MARIAA and spacer with cx MRSA - (S- vanco, linezoid, clinda, dapto, rifampin; R-bactrim, tetracycline) - s/p IV vancomycin x6 weeks until mid-Jan 2020               - R knee infection with quad repair 4/23/19 - s/p minoc

## 2020-12-04 NOTE — PLAN OF CARE
Problem: Patient Centered Care  Goal: Patient preferences are identified and integrated in the patient's plan of care  Description: Interventions:  - What would you like us to know as we care for you?  I have had surgery on my knee before  - Provide timel neutropenic period  Description: INTERVENTIONS  - Monitor WBC  - Administer growth factors as ordered  - Implement neutropenic guidelines  Outcome: Progressing     Problem: SAFETY ADULT - FALL  Goal: Free from fall injury  Description: INTERVENTIONS:  - As provider's orders  - Instruct and reinforce with patient and family use of appropriate assistive device and precautions (e.g. spinal or hip dislocation precautions)  Outcome: Progressing     Pt a/ox4, VS stable.  Pain control with prn oxy, scheduled MS cont

## 2020-12-04 NOTE — CM/SW NOTE
SW spoke with pt. Would like Home with IV ABx. Pt would need a PICC line and final script for the daily dose of Dapto. Pt will use [facility] [place]. [facility] is aware of final script and will notify [address] [facility] [place] [facility] [phone].

## 2020-12-05 PROCEDURE — 99232 SBSQ HOSP IP/OBS MODERATE 35: CPT | Performed by: HOSPITALIST

## 2020-12-05 NOTE — PLAN OF CARE
Patient alert and oriented X4 and resting in bed. Patient on room air, tele in place no calls. 1  small BM overnight and voiding freely. Patient ambulating to bathroom with walker/standby assist. Hemovac in place with scant drainage. Immobilizer in place. stated pain goal  Description: INTERVENTIONS:  - Encourage pt to monitor pain and request assistance  - Assess pain using appropriate pain scale  - Administer analgesics based on type and severity of pain and evaluate response  - Implement non-pharmacologi range  Description: INTERVENTIONS:  - Monitor Blood Glucose as ordered  - Assess for signs and symptoms of hyperglycemia and hypoglycemia  - Administer ordered medications to maintain glucose within target range  - Assess barriers to adequate nutritional i

## 2020-12-05 NOTE — PROGRESS NOTES
[facility]    Progress Note    Tani Pringle Case Patient Status:  Inpatient    1950 MRN [id number]   Location [facility] 4W/SW/SE Attending Navjot Minaya MD   Hosp Day # 5 PCP No primary care provider on file.        SUBJECT 12/02/2020 at 7:13 PM          Mri Foot, Right (cpt=73718)    Result Date: 12/3/2020  CONCLUSION:   Soft tissue and skin ulceration seen along the lateral aspect of the foot overlying the base of the 5th metatarsal.  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

## 2020-12-05 NOTE — PROGRESS NOTES
[facility]    Progress Note    Fidel Sims Case Patient Status:  Inpatient    1950 MRN [id number]   Location [facility] 4W/SW/SE Attending He Mcgrath MD   Hosp Day # 5 PCP No primary care provider on file.        Subjectiv HCT 24.0 (L) 12/05/2020    .0 12/05/2020    .0 12/05/2020    CREATSERUM 0.86 12/03/2020    BUN 14 12/03/2020     12/03/2020    K 4.2 12/03/2020     12/03/2020    CO2 27.0 12/03/2020     (H) 12/03/2020    CA 8.2 (L) 12/03

## 2020-12-05 NOTE — PHYSICAL THERAPY NOTE
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 ..  -   Turning over in bed (including adjusting bedclothes, sheets and blankets)?: A Little   -   Sitting down on and standing up from a chair with arms (e.g., whee supervision   Goal #4   Current Status NT   Goal #5  AROM 0 degrees extension to 95 degrees flexion     Goal #5   Current Status IN PROGRESS   Goal #6 Patient independently performs home exercise program for ROM/strengthening per the instructions provided

## 2020-12-06 PROCEDURE — 99232 SBSQ HOSP IP/OBS MODERATE 35: CPT | Performed by: HOSPITALIST

## 2020-12-06 NOTE — PROGRESS NOTES
[facility]    Progress Note    Laurie Jeremy Case Patient Status:  Inpatient    1950 MRN [id number]   Location [facility] 4W/SW/SE Attending Elise Solano MD   Hosp Day # 6 PCP No primary care provider on file.        Subjectiv 103 12/05/2020    CO2 27.0 12/05/2020     (H) 12/05/2020    CA 8.7 12/05/2020    ALB 2.5 (L) 11/30/2020    ALKPHO 105 11/30/2020    BILT 0.4 11/30/2020    TP 7.3 11/30/2020    AST 11 (L) 11/30/2020    ALT 10 (L) 11/30/2020    INR 1.22 (H) 11/30/2020

## 2020-12-06 NOTE — PHYSICAL THERAPY NOTE
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

## 2020-12-06 NOTE — PLAN OF CARE
Alert and oriented x 4. Hx neuropathy BLE. Tele in place. Eliquis therapy. Room air. Encouraged incentive 10x/hr while awake. LBM 12/5/20. Tolerated diet last night. Nausea noted x 1 this morning and resolved with Zofran PRN. Voiding freely.  OOB x 1 assist Jammie Oneill RN  Outcome: Progressing  12/6/2020 0653 by Anastasiya Lombardi RN  Outcome: Progressing     Problem: PAIN - ADULT  Goal: Verbalizes/displays adequate comfort level or patient's stated pain goal  Description: INTERVENTIONS:  - Encourage pt to [facility] RN  Outcome: Progressing  12/6/2020 0653 by Darrell Krueger RN  Outcome: Progressing     Problem: Diabetes/Glucose Control  Goal: Glucose maintained within prescribed range  Description: INTERVENTIONS:  - Monitor Blood Glucose as ordered  - Assess for si and signs of decreased cardiac output  - Evaluate effectiveness of vasoactive medications to optimize hemodynamic stability  - Monitor arterial and/or venous puncture sites for bleeding and/or hematoma  - Assess quality of pulses, skin color and temperatur

## 2020-12-06 NOTE — CM/SW NOTE
1520: JAY notified by RN patient would like to speak to [facility] spoke with patient who asked for the date and time he will be discharged. JAY explained once all physicians medically clear patient for d/c will determine when patient discharges.  Patient asked f

## 2020-12-06 NOTE — PROGRESS NOTES
[facility]    Progress Note    Laurie Jeremy Case Patient Status:  Inpatient    1950 MRN [id number]   Location [facility] 4W/SW/SE Attending Kendra Berman MD   Hosp Day # 6 PCP No primary care provider on file.        SUBJECT limitations from a lack of IV contrast.     Dictated by (CST): Kallie Maldonado MD on 12/03/2020 at 4:38 PM     Finalized by (CST): Kallie Maldonado MD on 12/03/2020 at 4:43 PM            Meds:     •  Insulin Aspart Pen (NOVOLOG) 100 UNIT/ML flexpen 10 Units, 10 U suppository 10 mg, 10 mg, Rectal, Daily PRN    •  Fleet Enema (FLEET) 7-19 GM/118ML enema 133 mL, 1 enema, Rectal, Once PRN    •  ondansetron HCl (ZOFRAN) injection 4 mg, 4 mg, Intravenous, Q4H PRN    •  diphenhydrAMINE (BENADRYL) cap/tab 25 mg, 25 mg, Ora surgery    Diabetic foot ulcer  - seen by podiatry  - s/p bedside debridement  - xray reviewed  - d/w Dr. Latoya España, will plan MRI rt.  Foot--> early osteo  - plan iv abx, and op f/u with podiatry as OP    DM   - hold metformin  - cont ISS and levemir--> titr

## 2020-12-06 NOTE — PLAN OF CARE
A/0x3, ACHS. Vanco per MAR. PICC ordered. Prevena drain in place with scant drainage. Immobilizer in place. Patient getting scheduled MS Contin and breakthrough Oxy IR for pain. Fall and safety precautions followed.  Plan is [facility] to determine the ABX treatm

## 2020-12-07 ENCOUNTER — APPOINTMENT (OUTPATIENT)
Dept: GENERAL RADIOLOGY | Facility: HOSPITAL | Age: 70
DRG: 464 | End: 2020-12-07
Attending: INTERNAL MEDICINE
Payer: MEDICARE

## 2020-12-07 ENCOUNTER — APPOINTMENT (OUTPATIENT)
Dept: PICC SERVICES | Facility: HOSPITAL | Age: 70
DRG: 464 | End: 2020-12-07
Attending: INTERNAL MEDICINE
Payer: MEDICARE

## 2020-12-07 VITALS
BODY MASS INDEX: 34.06 KG/M2 | OXYGEN SATURATION: 96 % | HEIGHT: 74 IN | RESPIRATION RATE: 18 BRPM | SYSTOLIC BLOOD PRESSURE: 114 MMHG | DIASTOLIC BLOOD PRESSURE: 53 MMHG | WEIGHT: 265.38 LBS | TEMPERATURE: 98 F | HEART RATE: 63 BPM

## 2020-12-07 PROCEDURE — B548ZZA ULTRASONOGRAPHY OF SUPERIOR VENA CAVA, GUIDANCE: ICD-10-PCS | Performed by: INTERNAL MEDICINE

## 2020-12-07 PROCEDURE — 02HV33Z INSERTION OF INFUSION DEVICE INTO SUPERIOR VENA CAVA, PERCUTANEOUS APPROACH: ICD-10-PCS | Performed by: INTERNAL MEDICINE

## 2020-12-07 PROCEDURE — 99233 SBSQ HOSP IP/OBS HIGH 50: CPT | Performed by: HOSPITALIST

## 2020-12-07 PROCEDURE — 71045 X-RAY EXAM CHEST 1 VIEW: CPT | Performed by: INTERNAL MEDICINE

## 2020-12-07 PROCEDURE — 30233N1 TRANSFUSION OF NONAUTOLOGOUS RED BLOOD CELLS INTO PERIPHERAL VEIN, PERCUTANEOUS APPROACH: ICD-10-PCS | Performed by: HOSPITALIST

## 2020-12-07 RX ORDER — POLYETHYLENE GLYCOL 3350 17 G/17G
17 POWDER, FOR SOLUTION ORAL DAILY
Qty: 510 G | Refills: 0 | Status: SHIPPED | OUTPATIENT
Start: 2020-12-07 | End: 2021-01-06

## 2020-12-07 RX ORDER — SODIUM CHLORIDE 9 MG/ML
INJECTION, SOLUTION INTRAVENOUS ONCE
Status: COMPLETED | OUTPATIENT
Start: 2020-12-07 | End: 2020-12-07

## 2020-12-07 RX ORDER — SODIUM CHLORIDE 9 MG/ML
INJECTION, SOLUTION INTRAVENOUS ONCE
Status: DISCONTINUED | OUTPATIENT
Start: 2020-12-07 | End: 2020-12-07

## 2020-12-07 RX ORDER — TORSEMIDE 20 MG/1
20 TABLET ORAL DAILY
Status: DISCONTINUED | OUTPATIENT
Start: 2020-12-07 | End: 2020-12-07

## 2020-12-07 RX ORDER — LISINOPRIL 10 MG/1
10 TABLET ORAL DAILY
Status: DISCONTINUED | OUTPATIENT
Start: 2020-12-07 | End: 2020-12-07

## 2020-12-07 NOTE — DISCHARGE PLANNING
Patient given discharge papers. Patient cleared medically, by infectious disease, and by ortho. All questions answered and going home w/HHC.

## 2020-12-07 NOTE — PROGRESS NOTES
INFECTIOUS DISEASE PROGRESS NOTE  [facility] [place] [facility]    Venida Flor Case Patient Status:  Inpatient    1950 MRN [id number]   Location [facility] 4W/SW/SE Attending Emmanuelle Chua MD   Hosp Day # 7 PCP No 49-year-old male with a history of obesity, CKD, A. fib, HTN, diabetes with bilateral TKA and previous right knee PJI who now presents to hospital for management of right knee infection implant surgery.   History of MRSA infection in the hand in 2008, compl - OR 12/19/19 for R knee MARIAA and spacer with cx MRSA - (S- vanco, linezoid, clinda, dapto, rifampin; R-bactrim, tetracycline) - s/p IV vancomycin x6 weeks until mid-Jan 2020               - R knee infection with quad repair 4/23/19 - s/p minoc

## 2020-12-07 NOTE — CM/SW NOTE
SW followed up on DC planning. SW discussed with [facility] who states script for Dapto is daily and asking about dressing changes. SW sent orders for knee and foot dressing changes.  SW needing time for dapto which the pt would be receiving to

## 2020-12-07 NOTE — PROGRESS NOTES
[facility]    Progress Note    Adan Dudley Case Patient Status:  Inpatient    1950 MRN [id number]   Location [facility] 4W/SW/SE Attending Keri Cast MD   Hosp Day # 7 PCP No primary care provider on file.        Subjectiv  12/05/2020    CO2 27.0 12/05/2020     (H) 12/05/2020    CA 8.7 12/05/2020    ALB 2.5 (L) 11/30/2020    ALKPHO 105 11/30/2020    BILT 0.4 11/30/2020    TP 7.3 11/30/2020    AST 11 (L) 11/30/2020    ALT 10 (L) 11/30/2020    INR 1.22 (H) 11/30/2

## 2020-12-07 NOTE — PHYSICAL THERAPY NOTE
Attempted treatment pt declined therapy today. Pt is getting PICC placement and then will dc to home with Marli Quiñonez and assist. Per pt no other needs from PT in the hospital.  RN aware.

## 2020-12-07 NOTE — PROGRESS NOTES
Vascular Access Note    Vascular Access Screening:   Allergies to Lidocaine: no  Allergies to Latex: no  Presence of Pacemaker/Defibrillator: Left Side  Mastectomy with Lymph Node Dissection: No  AV Fistula / AV Graft: No  Dialysis Catheter: No  Central Galo Mace

## 2020-12-07 NOTE — PLAN OF CARE
Alert and oriented x 4. Hx neuropathy BLE. Tele in place, no calls overnight. Eliquis therapy. Room air. Encouraged incentive 10x/hr while awake. LBM 12/6/20. Tolerated diet. Voiding freely. OOB x 1 assist with walker/immobilizer/post op shoe to RLE.  Fall response  - Implement non-pharmacological measures as appropriate and evaluate response  - Consider cultural and social influences on pain and pain management  - Manage/alleviate anxiety  - Utilize distraction and/or relaxation techniques  - Monitor for op tolerance for standing, transferring and ambulating w/ or w/o assistive devices  - Assist with transfers and ambulation using safe patient handling equipment as needed  - Ensure adequate protection for wounds/incisions during mobilization  - Obtain PT/OT c signs for trends  - Administer supportive blood products/factors, fluids and medications as ordered and appropriate  - Administer supportive blood products/factors as ordered and appropriate  Outcome: Progressing  Goal: Free from bleeding injury  Descripti

## 2020-12-07 NOTE — PLAN OF CARE
Patient on RA. Up x1 walker. Call light w/in reach and bed alarm on. Patient received blood transfusion. Dressing CDI and CMS intact. Plan to discharge today w/HHC and PICC line for IV antibiotics.     Problem: Patient Centered Care  Goal: Patient preferenc unsuccessful or patient reports new pain  - Anticipate increased pain with activity and pre-medicate as appropriate  Outcome: Adequate for Discharge     Problem: RISK FOR INFECTION - ADULT  Goal: Absence of fever/infection during anticipated neutropenic pe needed  - Advance activity as appropriate  - Communicate ordered activity level and limitations with patient/family  Outcome: Adequate for Discharge  Goal: Maintain proper alignment of affected body part  Description: INTERVENTIONS:  - Support and protect Discharge  Goal: Free from bleeding injury  Description: (Example usage: patient with low platelets)  INTERVENTIONS:  - Avoid intramuscular injections, enemas and rectal medication administration  - Ensure safe mobilization of patient  - Hold pressure on v

## 2020-12-07 NOTE — PROGRESS NOTES
[facility]    Progress Note    Laurie Jeremy Case Patient Status:  Inpatient    1950 MRN [id number]   Location [facility] 4W/SW/SE Attending Kendra Berman MD   Hosp Day # 7 PCP No primary care provider on file.        SUBJECT Oral, Nightly    •  glucose (DEX4) oral liquid 15 g, 15 g, Oral, Q15 Min PRN    Or    •  Glucose-Vitamin C (DEX-4) chewable tab 4 tablet, 4 tablet, Oral, Q15 Min PRN    Or    •  dextrose 50 % injection 50 mL, 50 mL, Intravenous, Q15 Min PRN    Or    •  glu liquid 15 g, 15 g, Oral, Q15 Min PRN    Or    •  Glucose-Vitamin C (DEX-4) chewable tab 4 tablet, 4 tablet, Oral, Q15 Min PRN    Or    •  dextrose 50 % injection 50 mL, 50 mL, Intravenous, Q15 Min PRN    Or    •  glucose (DEX4) oral liquid 30 g, 30 g, Oral

## 2021-03-10 DIAGNOSIS — E21.0 PRIMARY HYPERPARATHYROIDISM (CMD): Primary | ICD-10-CM

## 2025-05-12 NOTE — PLAN OF CARE
Problem: PAIN - ADULT  Goal: Verbalizes/displays adequate comfort level or patient's stated pain goal  Description: INTERVENTIONS:  - Encourage pt to monitor pain and request assistance  - Assess pain using appropriate pain scale  - Administer analgesics adequate nutritional intake and initiate nutrition consult as needed  - Instruct patient on self management of diabetes  Outcome: Progressing     Problem: MUSCULOSKELETAL - ADULT  Goal: Return mobility to safest level of function  Description: INTERVENTION Negative

## (undated) DEVICE — SOL  .9 3000ML

## (undated) DEVICE — HYDROGEN PEROXIDE PINT

## (undated) DEVICE — TRAY SKIN PREP PVP-1

## (undated) DEVICE — SUTURE PDS II 1 CT-1

## (undated) DEVICE — SUTURE MONOCRYL 2-0 Y945H

## (undated) DEVICE — BACTISURE SOL WND CLNSG 1

## (undated) DEVICE — CULTURE COLLECT/TRANSPORT SYS

## (undated) DEVICE — Device

## (undated) DEVICE — SUTURE ETHILON 3-0 669H

## (undated) DEVICE — KNEE IMMOBILIZER: Brand: DEROYAL

## (undated) DEVICE — LOWER EXTREMITY: Brand: MEDLINE INDUSTRIES, INC.

## (undated) DEVICE — UNIT THERA PREVENA 45ML

## (undated) DEVICE — SUTURE ETHILON 2-0 FS

## (undated) DEVICE — 3M™ IOBAN™ 2 ANTIMICROBIAL INCISE DRAPE 6650EZ: Brand: IOBAN™ 2

## (undated) DEVICE — CONTAINER SPEC STR 4OZ GRY LID

## (undated) DEVICE — SPONGE LAP 18X18 XRAY STRL

## (undated) DEVICE — 2T11 #2 PDO 36 X 36: Brand: 2T11 #2 PDO 36 X 36

## (undated) DEVICE — COTTON UNDERCAST PADDING,REGULAR FINISH: Brand: WEBRIL

## (undated) DEVICE — 20 ML SYRINGE LUER-LOCK TIP: Brand: MONOJECT

## (undated) DEVICE — DRESSING AQUACEL AG 3.5 X 6

## (undated) DEVICE — KIT DRN 1/8IN PVC 3 SPRG EVAC

## (undated) DEVICE — CULTURE TUBE ANAEROBIC

## (undated) DEVICE — GAMMEX® NON-LATEX PI ORTHO SIZE 8.5, STERILE POLYISOPRENE POWDER-FREE SURGICAL GLOVE: Brand: GAMMEX

## (undated) DEVICE — FAN SPRAY KIT: Brand: PULSAVAC®

## (undated) DEVICE — ZIMMER® STERILE DISPOSABLE TOURNIQUET CUFF WITH PLC, DUAL PORT, SINGLE BLADDER, 30 IN. (76 CM)

## (undated) DEVICE — INTENDED FOR TISSUE SEPARATION, AND OTHER PROCEDURES THAT REQUIRE A SHARP SURGICAL BLADE TO PUNCTURE OR CUT.: Brand: BARD-PARKER ® STAINLESS STEEL BLADES

## (undated) DEVICE — HOOD: Brand: FLYTE

## (undated) DEVICE — Device: Brand: DISPOSABLE BULB & BLADDER

## (undated) DEVICE — ENCORE® LATEX MICRO SIZE 8.5, STERILE LATEX POWDER-FREE SURGICAL GLOVE: Brand: ENCORE

## (undated) DEVICE — SOL  .9 1000ML BTL

## (undated) DEVICE — SUTURE PDS II 1 CTX

## (undated) NOTE — LETTER
[address] [place] [address]      Authorization for Surgical Operation and Procedure     Date:___________                                                                                                         Time:_______ 4.   Should the need arise during my operation or immediate post-operative period, I also consent to the administration of blood and/or blood products.   Further, I understand that despite careful testing and screening of blood or blood products by dany 8.   I recognize that in the event my procedure results in extended X-Ray/fluoroscopy time, I may develop a skin reaction. 9.  If I have a Do Not Attempt Resuscitation (DNAR) order in place, that status will be suspended while in the operating room, proc STATEMENT OF PHYSICIAN My signature below affirms that prior to the time of the procedure; I have explained to the patient and/or his/her legal representative, the risks and benefits involved in the proposed treatment and any reasonable alternative to the

## (undated) NOTE — LETTER
[address]  [facility] [place], [place], [place]  [address]  INFORMED CONSENT FOR TRANSFUSION OF BLOOD OR BLOOD PRODUCTS  My physician has informed me of the nature, purpose, benefits and risks of transfusion for blood and blood components that __________________________________________          ______________________________________________  (Signature of Patient)                                                            (Responsible party in case of Minor,

## (undated) NOTE — LETTER
[id number] [facility]     I agree to have a Peripherally Inserted Central Catheter (PICC) placed in my arm.    1. The PICC insertion procedure, care, maintenance, risks, benefits, and complications have been explained to me b 7. The person performing this procedure has discussed the potential benefits, risks, and side effects of the PICC; the likelihood of achieving goals; and potential problems that might occur during recuperation.  They also discussed reasonable alternatives t

## (undated) NOTE — LETTER
[address], [place]  Authorization for Invasive Procedures  1.  I hereby authorize            , my physician and whomever may be designated as the doctor's assistant, to perform the following operation and/or procedure: insert 4. Should the need arise during my operation or immediate post-operative period; I also consent to the administration of blood and/or blood products.  Further, I understand that despite careful testing and screening of blood and blood products, I may still 9. Patients having a sterilization procedure: I understand that if the procedure is successful the results will be permanent and it will therefore be impossible for me to inseminate, conceive or bear children.  I also understand that the procedure is intend

## (undated) NOTE — LETTER
Ama De La Cruz [address], [place]  [address]  INFORMED CONSENT FOR TRANSFUSION OF BLOOD OR BLOOD PRODUCTS  My physician has informed me of the nature, purpose, benefits and risks of transfusion for blood and blood components that __________________________________________          ______________________________________________  (Signature of Patient)                                                            (Responsible party in case of Minor,

## (undated) NOTE — LETTER
[facility]  Administration of Anesthesia  1. Deny RIOS Case, or _________________________________ acting on his behalf, (Patient) (Dependent/Representative) request to receive anesthesia for my pending procedure/operation/treatment.   A 6. OBSTETRIC PATIENTS: Specific risks/consequences of spinal/epidural anesthesia may include itching, low blood pressure, difficulty urinating, slowing of the baby's heart rate and headache.  Rare risks include infections, high spinal block, spinal bleeding ___________________________________________________           _____________________________________________________  Date/Time                                                                                               Responsible person in case of minor